# Patient Record
Sex: FEMALE | Race: OTHER | NOT HISPANIC OR LATINO | URBAN - METROPOLITAN AREA
[De-identification: names, ages, dates, MRNs, and addresses within clinical notes are randomized per-mention and may not be internally consistent; named-entity substitution may affect disease eponyms.]

---

## 2017-04-13 ENCOUNTER — APPOINTMENT (OUTPATIENT)
Dept: RADIOLOGY | Facility: MEDICAL CENTER | Age: 22
DRG: 918 | End: 2017-04-13
Attending: EMERGENCY MEDICINE
Payer: COMMERCIAL

## 2017-04-13 ENCOUNTER — HOSPITAL ENCOUNTER (INPATIENT)
Facility: MEDICAL CENTER | Age: 22
LOS: 5 days | DRG: 918 | End: 2017-04-19
Attending: EMERGENCY MEDICINE | Admitting: INTERNAL MEDICINE
Payer: COMMERCIAL

## 2017-04-13 DIAGNOSIS — T50.902A OVERDOSE, INTENTIONAL SELF-HARM, INITIAL ENCOUNTER (HCC): ICD-10-CM

## 2017-04-13 DIAGNOSIS — R00.0 TACHYCARDIA: ICD-10-CM

## 2017-04-13 DIAGNOSIS — E86.0 DEHYDRATION: ICD-10-CM

## 2017-04-13 LAB
ALBUMIN SERPL BCP-MCNC: 3.9 G/DL (ref 3.2–4.9)
ALBUMIN/GLOB SERPL: 1.3 G/DL
ALP SERPL-CCNC: 50 U/L (ref 30–99)
ALT SERPL-CCNC: 13 U/L (ref 2–50)
AMPHET UR QL SCN: NEGATIVE
ANION GAP SERPL CALC-SCNC: 24 MMOL/L (ref 0–11.9)
APAP SERPL-MCNC: 27 UG/ML (ref 10–30)
APPEARANCE UR: CLEAR
AST SERPL-CCNC: 20 U/L (ref 12–45)
BACTERIA #/AREA URNS HPF: ABNORMAL /HPF
BARBITURATES UR QL SCN: NEGATIVE
BASOPHILS # BLD AUTO: 0.6 % (ref 0–1.8)
BASOPHILS # BLD: 0.09 K/UL (ref 0–0.12)
BENZODIAZ UR QL SCN: NEGATIVE
BILIRUB SERPL-MCNC: 0.2 MG/DL (ref 0.1–1.5)
BILIRUB UR QL STRIP.AUTO: NEGATIVE
BUN SERPL-MCNC: 6 MG/DL (ref 8–22)
BZE UR QL SCN: NEGATIVE
CALCIUM SERPL-MCNC: 8.4 MG/DL (ref 8.5–10.5)
CANNABINOIDS UR QL SCN: POSITIVE
CHLORIDE SERPL-SCNC: 107 MMOL/L (ref 96–112)
CO2 SERPL-SCNC: 11 MMOL/L (ref 20–33)
COLOR UR: COLORLESS
CREAT SERPL-MCNC: 0.77 MG/DL (ref 0.5–1.4)
EOSINOPHIL # BLD AUTO: 0.13 K/UL (ref 0–0.51)
EOSINOPHIL NFR BLD: 0.8 % (ref 0–6.9)
EPI CELLS #/AREA URNS HPF: ABNORMAL /HPF
ERYTHROCYTE [DISTWIDTH] IN BLOOD BY AUTOMATED COUNT: 47.5 FL (ref 35.9–50)
ETHANOL BLD-MCNC: 0 G/DL
GFR SERPL CREATININE-BSD FRML MDRD: >60 ML/MIN/1.73 M 2
GLOBULIN SER CALC-MCNC: 3.1 G/DL (ref 1.9–3.5)
GLUCOSE SERPL-MCNC: 122 MG/DL (ref 65–99)
GLUCOSE UR STRIP.AUTO-MCNC: NEGATIVE MG/DL
HCG SERPL QL: NEGATIVE
HCT VFR BLD AUTO: 42.3 % (ref 37–47)
HGB BLD-MCNC: 13.6 G/DL (ref 12–16)
IMM GRANULOCYTES # BLD AUTO: 0.27 K/UL (ref 0–0.11)
IMM GRANULOCYTES NFR BLD AUTO: 1.7 % (ref 0–0.9)
KETONES UR STRIP.AUTO-MCNC: 10 MG/DL
LEUKOCYTE ESTERASE UR QL STRIP.AUTO: NEGATIVE
LYMPHOCYTES # BLD AUTO: 1.74 K/UL (ref 1–4.8)
LYMPHOCYTES NFR BLD: 11.1 % (ref 22–41)
MCH RBC QN AUTO: 30.1 PG (ref 27–33)
MCHC RBC AUTO-ENTMCNC: 32.2 G/DL (ref 33.6–35)
MCV RBC AUTO: 93.6 FL (ref 81.4–97.8)
MDMA UR QL SCN: NEGATIVE
METHADONE UR QL SCN: NEGATIVE
MICRO URNS: ABNORMAL
MONOCYTES # BLD AUTO: 0.62 K/UL (ref 0–0.85)
MONOCYTES NFR BLD AUTO: 4 % (ref 0–13.4)
MUCOUS THREADS #/AREA URNS HPF: ABNORMAL /HPF
NEUTROPHILS # BLD AUTO: 12.78 K/UL (ref 2–7.15)
NEUTROPHILS NFR BLD: 81.8 % (ref 44–72)
NITRITE UR QL STRIP.AUTO: NEGATIVE
NRBC # BLD AUTO: 0 K/UL
NRBC BLD AUTO-RTO: 0 /100 WBC
OPIATES UR QL SCN: NEGATIVE
OXYCODONE UR QL SCN: NEGATIVE
PCP UR QL SCN: NEGATIVE
PH UR STRIP.AUTO: 6 [PH]
PLATELET # BLD AUTO: 268 K/UL (ref 164–446)
PMV BLD AUTO: 10.6 FL (ref 9–12.9)
POTASSIUM SERPL-SCNC: 2.9 MMOL/L (ref 3.6–5.5)
PROPOXYPH UR QL SCN: NEGATIVE
PROT SERPL-MCNC: 7 G/DL (ref 6–8.2)
PROT UR QL STRIP: NEGATIVE MG/DL
RBC # BLD AUTO: 4.52 M/UL (ref 4.2–5.4)
RBC # URNS HPF: ABNORMAL /HPF
RBC UR QL AUTO: ABNORMAL
SALICYLATES SERPL-MCNC: 0 MG/DL (ref 15–25)
SODIUM SERPL-SCNC: 142 MMOL/L (ref 135–145)
SP GR UR STRIP.AUTO: 1.01
WBC # BLD AUTO: 15.6 K/UL (ref 4.8–10.8)
WBC #/AREA URNS HPF: ABNORMAL /HPF

## 2017-04-13 PROCEDURE — 80053 COMPREHEN METABOLIC PANEL: CPT

## 2017-04-13 PROCEDURE — 304561 HCHG STAT O2

## 2017-04-13 PROCEDURE — 36415 COLL VENOUS BLD VENIPUNCTURE: CPT

## 2017-04-13 PROCEDURE — 99285 EMERGENCY DEPT VISIT HI MDM: CPT

## 2017-04-13 PROCEDURE — 70450 CT HEAD/BRAIN W/O DYE: CPT

## 2017-04-13 PROCEDURE — 304562 HCHG STAT O2 MASK/CANNULA

## 2017-04-13 PROCEDURE — 81001 URINALYSIS AUTO W/SCOPE: CPT

## 2017-04-13 PROCEDURE — 80307 DRUG TEST PRSMV CHEM ANLYZR: CPT

## 2017-04-13 PROCEDURE — 96360 HYDRATION IV INFUSION INIT: CPT

## 2017-04-13 PROCEDURE — 51701 INSERT BLADDER CATHETER: CPT

## 2017-04-13 PROCEDURE — 84703 CHORIONIC GONADOTROPIN ASSAY: CPT

## 2017-04-13 PROCEDURE — 85025 COMPLETE CBC W/AUTO DIFF WBC: CPT

## 2017-04-13 PROCEDURE — 700105 HCHG RX REV CODE 258: Performed by: EMERGENCY MEDICINE

## 2017-04-13 RX ORDER — SODIUM CHLORIDE 9 MG/ML
1000 INJECTION, SOLUTION INTRAVENOUS ONCE
Status: COMPLETED | OUTPATIENT
Start: 2017-04-13 | End: 2017-04-13

## 2017-04-13 RX ADMIN — SODIUM CHLORIDE 1000 ML: 9 INJECTION, SOLUTION INTRAVENOUS at 22:36

## 2017-04-13 ASSESSMENT — PAIN SCALES - GENERAL: PAINLEVEL_OUTOF10: ASSUMED PAIN PRESENT

## 2017-04-13 NOTE — IP AVS SNAPSHOT
[employer] Access Code: Activation code not generated  Current [employer] Status: Patient Declined    [employer]  A secure, online tool to manage your health information     [employer]® is a secure, online tool that connects you to your personalized health information from the privacy of your home -- day or night - making it very easy for you to manage your healthcare. Once the activation process is completed, you can even access your medical information using the [employer] april, which is available for free in the Apple April store or Google Play store.     [employer] provides the following levels of access (as shown below):   My Chart Features   [facility] Primary Care Doctor [facility]  Specialists [facility]  Urgent  Care Non-[facility]  Primary Care  Doctor   Email your healthcare team securely and privately 24/7 X X X X   Manage appointments: schedule your next appointment; view details of past/upcoming appointments X      Request prescription refills. X      View recent personal medical records, including lab and immunizations X X X X   View health record, including health history, allergies, medications X X X X   Read reports about your outpatient visits, procedures, consult and ER notes X X X X   See your discharge summary, which is a recap of your hospital and/or ER visit that includes your diagnosis, lab results, and care plan. X X       How to register for [employer]:  1. Go to  [email or web address].  2. Click on the Sign Up Now box, which takes you to the New Member Sign Up page. You will need to provide the following information:  a. Enter your [employer] Access Code exactly as it appears at the top of this page. (You will not need to use this code after you’ve completed the sign-up process. If you do not sign up before the expiration date, you must request a new code.)   b. Enter your date of birth.   c. Enter your home email address.   d. Click Submit, and follow the next screen’s instructions.  3. Create a [employer] ID.  This will be your [employer] login ID and cannot be changed, so think of one that is secure and easy to remember.  4. Create a [employer] password. You can change your password at any time.  5. Enter your Password Reset Question and Answer. This can be used at a later time if you forget your password.   6. Enter your e-mail address. This allows you to receive e-mail notifications when new information is available in [employer].  7. Click Sign Up. You can now view your health information.    For assistance activating your [employer] account, call [phone]

## 2017-04-13 NOTE — IP AVS SNAPSHOT
4/19/2017    Lana Newby  [address] [place] [address]    Dear Lana PAUL:    [facility] wants to ensure your discharge home is safe and you or your loved ones have had all of your questions answered regarding your care after you leave the hospital.    Below is a list of resources and contact information should you have any questions regarding your hospital stay, follow-up instructions, or active medical symptoms.    Questions or Concerns Regarding… Contact   Medical Questions Related to Your Discharge  (7 days a week, 8am-5pm) Contact a Nurse Care Coordinator   [phone]   Medical Questions Not Related to Your Discharge  (24 hours a day / 7 days a week)  Contact the Nurse Health Line   [phone]    Medications or Discharge Instructions Refer to your discharge packet   or contact your [facility] Primary Care Provider   [phone]   Follow-up Appointment(s) Schedule your appointment via [employer]   or contact Scheduling [phone]   Billing Review your statement via [employer]  or contact Billing [phone]   Medical Records Review your records via [employer]   or contact Medical Records [phone]     You may receive a telephone call within two days of discharge. This call is to make certain you understand your discharge instructions and have the opportunity to have any questions answered. You can also easily access your medical information, test results and upcoming appointments via the [employer] free online health management tool. You can learn more and sign up at [employer]. For assistance setting up your [employer] account, please call [phone].    Once again, we want to ensure your discharge home is safe and that you have a clear understanding of any next steps in your care. If you have any questions or concerns, please do not hesitate to contact us, we are here for you. Thank you for choosing [facility] for your healthcare needs.    Sincerely,    Your [facility] Healthcare Team

## 2017-04-13 NOTE — IP AVS SNAPSHOT
" <p align=\"LEFT\"><IMG SRC=\"//[email or web address]$/[email or web address]\" alt=\"Image\" WIDTH=\"50%\" HEIGHT=\"200\" BORDER=\"\"></p>                   Name:Lana Newby  Medical Record Number:[id number]  CSN: [id number]    YOB: 1995   Age: 22 y.o.  Sex: female  HT:1.6 m (5' 3\") WT: 53.1 kg (117 lb 1 oz)          Admit Date: 4/13/2017     Discharge Date:   Today's Date: 4/19/2017  Attending Doctor:  ROMINA Ludwig*                  Allergies:  Amoxicillin; Bactrim ds; and Other drug          Your appointments     Apr 20, 2017  1:00 PM   CARE MANAGER TELEPHONE VISIT with CARE MANAGER   [address] [address] [phone]           ***IMPORTANT**** This is a phone visit only. Do not come into the office. The Care Manager will call you at the scheduled time for Care Manager Telephone Visit.            Apr 26, 2017  3:40 PM   Established Patient with BUNNY Sommer   [address] [address] [phone]           You will be receiving a confirmation call a few days before your appointment from our automated call confirmation system.            May 31, 2017  3:00 PM   Established Patient with Jerson Aguilar M.D.   [address] [id number] [phone]           You will be receiving a confirmation call a few days before your appointment from our automated call confirmation system.                 Medication List      Notice     You have not been prescribed any medications.      "

## 2017-04-13 NOTE — IP AVS SNAPSHOT
" Home Care Instructions                                                                                                                  Name:Lana Newby  Medical Record Number:[id number]  CSN: [id number]    YOB: 1995   Age: 22 y.o.  Sex: female  HT:1.6 m (5' 3\") WT: 53.1 kg (117 lb 1 oz)          Admit Date: 4/13/2017     Discharge Date:   Today's Date: 4/19/2017  Attending Doctor:  ROMINA Ludwig*                  Allergies:  Amoxicillin; Bactrim ds; and Other drug            Discharge Instructions       Discharge Instructions    Discharged to home by car with relative. Discharged via walking, hospital escort: Refused.  Special equipment needed: Not Applicable    Be sure to schedule a follow-up appointment with your primary care doctor or any specialists as instructed.     Discharge Plan:   Diet Plan: Discussed  Activity Level: Discussed  Confirmed Follow up Appointment: Patient to Call and Schedule Appointment  Confirmed Symptoms Management: Discussed  Medication Reconciliation Updated: Yes  Influenza Vaccine Indication: Patient Refuses    I understand that a diet low in cholesterol, fat, and sodium is recommended for good health. Unless I have been given specific instructions below for another diet, I accept this instruction as my diet prescription.   Other diet: Regular    Special Instructions: None    · Is patient discharged on Warfarin / Coumadin?   No     · Is patient Post Blood Transfusion?  No    Depression / Suicide Risk    As you are discharged from this [facility] facility, it is important to learn how to keep safe from harming yourself.    Recognize the warning signs:  · Abrupt changes in personality, positive or negative- including increase in energy   · Giving away possessions  · Change in eating patterns- significant weight changes-  positive or negative  · Change in sleeping patterns- unable to sleep or sleeping all the time   · Unwillingness or inability to " "communicate  · Depression  · Unusual sadness, discouragement and loneliness  · Talk of wanting to die  · Neglect of personal appearance   · Rebelliousness- reckless behavior  · Withdrawal from people/activities they love  · Confusion- inability to concentrate     If you or a loved one observes any of these behaviors or has concerns about self-harm, here's what you can do:  · Talk about it- your feelings and reasons for harming yourself  · Remove any means that you might use to hurt yourself (examples: pills, rope, extension cords, firearm)  · Get professional help from the community (Mental Health, Substance Abuse, psychological counseling)  · Do not be alone:Call your Safe Contact- someone whom you trust who will be there for you.  · Call your local CRISIS HOTLINE 695-3695 or 257-886-5142  · Call your local Children's Mobile Crisis Response Team [place] (246) 324-5302 or www.[employer]  · Call the toll free National Suicide Prevention Hotlines   · National Suicide Prevention Lifeline 214-737-LEFY (4862)  · [employer] Hope Line Network 800-SUICIDE (900-1196)      Overdose, Adult  A person can overdose on alcohol, drugs or both by accident or on purpose. If it was on purpose, it is a serious matter. Professional help should be sought. If the overdose was an accident, certain steps should be taken to make sure that it never happens again.  ACCIDENTAL OVERDOSE  Overdosing on prescription medications can be a result of:  · Not understanding the instructions.  · Misreading the label.  · Forgetting that you took a dose and then taking another by mistake. This situation happens a lot.  To make sure this does not happen again:  · Clarify the correct dosage with your caregiver.  · Place the correct dosage in a \"pill-minder\" container (labeled for each day and time of day).  · Have someone dispense your medicine.  Please be sure to follow-up with your primary care doctor as directed.  INTENTIONAL OVERDOSE  If the " "overdose was on purpose, it is a serious situation. Taking more than the prescribed amount of medications (including taking someone else's prescription), abusing street drugs or drinking an amount of alcohol that requires medical treatment can show a variety of possible problems. These may indicate you:  · Are depressed or suicidal.  · Are abusing drugs, took too much or combined different drugs to experiment with the effects.  · Mixed alcohol with drugs and did not realize the danger of doing so (this is drug abuse).  · Are suffering addiction to drugs and/or alcohol (also known as chemical dependency).  · Binge drink.  If you have not been referred to a mental health professional for help, it is important that you get help right away. Only a professional can determine which problems may exist and what the best course of treatment may be. It is your responsibility to follow-up with further evaluation or treatment as directed.   Alcohol is responsible for a large number of overdoses and unintended deaths among college-age young adults. Binge drinking is consuming 4-5 drinks in a short period of time. The amount of alcohol in standard servings of wine (5 oz.), beer (12 oz.) and distilled spirits (1.5 oz., 80 proof) is the same. Beer or wine can be just as dangerous to the binge drinker as \"hard\" liquor can be.   CONSEQUENCES OF BINGE DRINKING  Alcohol poisoning is the most serious consequence of binge drinking. This is a severe and potentially fatal physical reaction to an alcohol overdose. When too much alcohol is consumed, the brain does not get enough oxygen. The lack of oxygen will eventually cause the brain to shut down the voluntary functions that regulate breathing and heart rate. Symptoms of alcohol poisoning include:  · Vomiting.  · Passing out (unconsciousness).  · Cold, clammy, pale or bluish skin.  · Slow or irregular breathing.  WHAT SHOULD I DO NEXT?  If you have a history of drug abuse or suffer " "chemical dependency (alcoholism, drug addiction or both), you might consider the following:  · Talk with a qualified substance abuse counselor and consider entering a treatment program.  · Go to a detox facility if necessary.  · If you were attending self-help group meetings, consider returning to them and go often.  · Explore other resources located near you (see sources listed below).  If you are unsure if you have a substance abuse problem, ask yourself the following questions:  · Have you been told by friends or family that drugs/alcohol has become a problem?  · Do you get into fights when drinking or using drugs?  · Do you have blackouts (not remembering what you do while using)?  · Do you lie about use or amounts of drugs or alcohol you consume?  · Do you need chemicals to get you going?  · Do you suffer in work or school performance because of drug or alcohol use?  · Do you get sick from drug or alcohol use but continue to use anyway?  · Do you need drugs or alcohol to relate to people or feel comfortable in social situations?  · Do you use drugs or alcohol to forget problems?  If you answered \"Yes\" to any of the above questions, it means you show signs of chemical dependency and a professional evaluation is suggested. The longer the use of drugs and alcohol continues, the problems will become greater.  SEEK IMMEDIATE MEDICAL CARE IF:   · You feel like you might repeat your problematic behavior.  · You need someone to talk to and feel that it should not wait.  · You feel you are a danger to yourself or someone else.  · You feel like you are having a new reaction to medications you are taking, or you are getting worse after leaving a care center.  · You have an overwhelming urge to drink or use drugs.  Addiction cannot be cured, but it can be treated successfully. Treatment centers are listed in the yellow pages under: Cocaine, Narcotics, and Alcoholics Anonymous. Most hospitals and clinics can refer you to a " specialized care center. The [place] government maintains a toll-free number for obtaining treatment referrals: 1-940.655.1009 or 1-947.958.9621 (TDD) and maintains a website: http://findtreatment.[facility].gov. Other websites for additional information are: www.mentalhealth.[facility].gov. and www.[place].gov.  [place] treatment resources are listed in each Province with listings available under [facility] or similar titles.  Document Released: 12/20/2004 Document Revised: 03/11/2013 Document Reviewed: 11/11/2009  ExitCare® Patient Information ©2014 [employer].      Your appointments     Apr 20, 2017  1:00 PM   CARE MANAGER TELEPHONE VISIT with CARE MANAGER   [facility]    [address]  [facility] [id number] [phone]           ***IMPORTANT**** This is a phone visit only. Do not come into the office. The Care Manager will call you at the scheduled time for Care Manager Telephone Visit.            Apr 26, 2017  3:40 PM   Established Patient with BUNNY Sommer   [facility])    [address]  [place] [id number] [phone]           You will be receiving a confirmation call a few days before your appointment from our automated call confirmation system.            May 31, 2017  3:00 PM   Established Patient with Jerson Aguilar M.D.   [facility])    [address] [place] [id number] [phone]           You will be receiving a confirmation call a few days before your appointment from our automated call confirmation system.                 Discharge Medication Instructions:    Below are the medications your physician expects you to take upon discharge:    Review all your home medications and newly ordered medications with your doctor and/or pharmacist. Follow medication instructions as directed by your doctor and/or pharmacist.    Please keep your medication list with you and share with  your physician.               Medication List      Notice     You have not been prescribed any medications.            Instructions           Diet / Nutrition:    Follow any diet instructions given to you by your doctor or the dietician, including how much salt (sodium) you are allowed each day.    If you are overweight, talk to your doctor about a weight reduction plan.    Activity:    Remain physically active following your doctor's instructions about exercise and activity.    Rest often.     Any time you become even a little tired or short of breath, SIT DOWN and rest.    Worsening Symptoms:    Report any of the following signs and symptoms to the doctor's office immediately:    *Pain of jaw, arm, or neck  *Chest pain not relieved by medication                               *Dizziness or loss of consciousness  *Difficulty breathing even when at rest   *More tired than usual                                       *Bleeding drainage or swelling of surgical site  *Swelling of feet, ankles, legs or stomach                 *Fever (>100ºF)  *Pink or blood tinged sputum  *Weight gain (3lbs/day or 5lbs /week)           *Shock from internal defibrillator (if applicable)  *Palpitations or irregular heartbeats                *Cool and/or numb extremities    Stroke Awareness    Common Risk Factors for Stroke include:    Age  Atrial Fibrillation  Carotid Artery Stenosis  Diabetes Mellitus  Excessive alcohol consumption  High blood pressure  Overweight   Physical inactivity  Smoking    Warning signs and symptoms of a stroke include:    *Sudden numbness or weakness of the face, arm or leg (especially on one side of the body).  *Sudden confusion, trouble speaking or understanding.  *Sudden trouble seeing in one or both eyes.  *Sudden trouble walking, dizziness, loss of balance or coordination.Sudden severe headache with no known cause.    It is very important to get treatment quickly when a stroke occurs. If you experience any of  the above warning signs, call 911 immediately.                   Disclaimer         Quit Smoking / Tobacco Use:    I understand the use of any tobacco products increases my chance of suffering from future heart disease or stroke and could cause other illnesses which may shorten my life. Quitting the use of tobacco products is the single most important thing I can do to improve my health. For further information on smoking / tobacco cessation call a Toll Free Quit Line at 1-383.513.9847 (*National Cancer [place]) or 1-899.163.2828 (American Lung Association) or you can access the web based program at www.lungusa.org.    [place] Tobacco Users Help Line:  (538) 288-1919       Toll Free: 1-439.587.3109  Quit Tobacco Program [facility] (007)777-3521    Crisis Hotline:    [place] Crisis Hotline:  8-379-GQBAGMX or 1-528.982.3895    [place] Crisis Hotline:    1-346.453.7098 or 882-244-0367    Discharge Survey:   Thank you for choosing [facility]. We hope we did everything we could to make your hospital stay a pleasant one. You may be receiving a phone survey and we would appreciate your time and participation in answering the questions. Your input is very valuable to us in our efforts to improve our service to our patients and their families.        My signature on this form indicates that:    1. I have reviewed and understand the above information.  2. My questions regarding this information have been answered to my satisfaction.  3. I have formulated a plan with my discharge nurse to obtain my prescribed medications for home.                  Disclaimer         __________________________________                     __________       ________                       Patient Signature                                                 Date                    Time

## 2017-04-14 ENCOUNTER — RESOLUTE PROFESSIONAL BILLING HOSPITAL PROF FEE (OUTPATIENT)
Dept: HOSPITALIST | Facility: MEDICAL CENTER | Age: 22
End: 2017-04-14
Payer: COMMERCIAL

## 2017-04-14 PROBLEM — T50.901A DRUG OVERDOSE: Status: ACTIVE | Noted: 2017-04-14

## 2017-04-14 PROBLEM — R45.851 SUICIDAL IDEATION: Status: ACTIVE | Noted: 2017-04-14

## 2017-04-14 LAB
ALBUMIN SERPL BCP-MCNC: 4.1 G/DL (ref 3.2–4.9)
ANION GAP SERPL CALC-SCNC: 13 MMOL/L (ref 0–11.9)
APAP SERPL-MCNC: <10 UG/ML (ref 10–30)
BUN SERPL-MCNC: 5 MG/DL (ref 8–22)
CALCIUM SERPL-MCNC: 8.2 MG/DL (ref 8.5–10.5)
CHLORIDE SERPL-SCNC: 107 MMOL/L (ref 96–112)
CO2 SERPL-SCNC: 20 MMOL/L (ref 20–33)
CREAT SERPL-MCNC: 0.59 MG/DL (ref 0.5–1.4)
ERYTHROCYTE [DISTWIDTH] IN BLOOD BY AUTOMATED COUNT: 45.5 FL (ref 35.9–50)
GFR SERPL CREATININE-BSD FRML MDRD: >60 ML/MIN/1.73 M 2
GLUCOSE SERPL-MCNC: 86 MG/DL (ref 65–99)
HCT VFR BLD AUTO: 41.4 % (ref 37–47)
HGB BLD-MCNC: 13.8 G/DL (ref 12–16)
MAGNESIUM SERPL-MCNC: 2 MG/DL (ref 1.5–2.5)
MCH RBC QN AUTO: 29.9 PG (ref 27–33)
MCHC RBC AUTO-ENTMCNC: 33.3 G/DL (ref 33.6–35)
MCV RBC AUTO: 89.8 FL (ref 81.4–97.8)
PHOSPHATE SERPL-MCNC: 2.5 MG/DL (ref 2.5–4.5)
PLATELET # BLD AUTO: 250 K/UL (ref 164–446)
PMV BLD AUTO: 10.2 FL (ref 9–12.9)
POTASSIUM SERPL-SCNC: 3 MMOL/L (ref 3.6–5.5)
RBC # BLD AUTO: 4.61 M/UL (ref 4.2–5.4)
SODIUM SERPL-SCNC: 140 MMOL/L (ref 135–145)
TSH SERPL DL<=0.005 MIU/L-ACNC: 0.63 UIU/ML (ref 0.3–3.7)
WBC # BLD AUTO: 17.7 K/UL (ref 4.8–10.8)

## 2017-04-14 PROCEDURE — 85027 COMPLETE CBC AUTOMATED: CPT

## 2017-04-14 PROCEDURE — 93005 ELECTROCARDIOGRAM TRACING: CPT | Performed by: EMERGENCY MEDICINE

## 2017-04-14 PROCEDURE — 80307 DRUG TEST PRSMV CHEM ANLYZR: CPT

## 2017-04-14 PROCEDURE — 83735 ASSAY OF MAGNESIUM: CPT

## 2017-04-14 PROCEDURE — 770020 HCHG ROOM/CARE - TELE (206)

## 2017-04-14 PROCEDURE — A9270 NON-COVERED ITEM OR SERVICE: HCPCS | Performed by: INTERNAL MEDICINE

## 2017-04-14 PROCEDURE — 36415 COLL VENOUS BLD VENIPUNCTURE: CPT

## 2017-04-14 PROCEDURE — 700102 HCHG RX REV CODE 250 W/ 637 OVERRIDE(OP): Performed by: INTERNAL MEDICINE

## 2017-04-14 PROCEDURE — 80069 RENAL FUNCTION PANEL: CPT

## 2017-04-14 PROCEDURE — 99223 1ST HOSP IP/OBS HIGH 75: CPT | Performed by: INTERNAL MEDICINE

## 2017-04-14 PROCEDURE — 84443 ASSAY THYROID STIM HORMONE: CPT

## 2017-04-14 PROCEDURE — 700105 HCHG RX REV CODE 258: Performed by: INTERNAL MEDICINE

## 2017-04-14 RX ORDER — POLYETHYLENE GLYCOL 3350 17 G/17G
1 POWDER, FOR SOLUTION ORAL
Status: DISCONTINUED | OUTPATIENT
Start: 2017-04-14 | End: 2017-04-19 | Stop reason: HOSPADM

## 2017-04-14 RX ORDER — POTASSIUM CHLORIDE 1.5 G/1.58G
40 POWDER, FOR SOLUTION ORAL
Status: COMPLETED | OUTPATIENT
Start: 2017-04-14 | End: 2017-04-15

## 2017-04-14 RX ORDER — BISACODYL 10 MG
10 SUPPOSITORY, RECTAL RECTAL
Status: DISCONTINUED | OUTPATIENT
Start: 2017-04-14 | End: 2017-04-19 | Stop reason: HOSPADM

## 2017-04-14 RX ORDER — AMOXICILLIN 250 MG
2 CAPSULE ORAL 2 TIMES DAILY
Status: DISCONTINUED | OUTPATIENT
Start: 2017-04-14 | End: 2017-04-19 | Stop reason: HOSPADM

## 2017-04-14 RX ORDER — FAMOTIDINE 20 MG/1
20 TABLET, FILM COATED ORAL 2 TIMES DAILY
Status: DISCONTINUED | OUTPATIENT
Start: 2017-04-14 | End: 2017-04-18

## 2017-04-14 RX ORDER — LORAZEPAM 2 MG/ML
1 INJECTION INTRAMUSCULAR EVERY 4 HOURS PRN
Status: DISCONTINUED | OUTPATIENT
Start: 2017-04-14 | End: 2017-04-19 | Stop reason: HOSPADM

## 2017-04-14 RX ORDER — SODIUM CHLORIDE 9 MG/ML
INJECTION, SOLUTION INTRAVENOUS CONTINUOUS
Status: DISCONTINUED | OUTPATIENT
Start: 2017-04-14 | End: 2017-04-18

## 2017-04-14 RX ORDER — CLONIDINE HYDROCHLORIDE 0.1 MG/1
0.1 TABLET ORAL EVERY 8 HOURS PRN
Status: DISCONTINUED | OUTPATIENT
Start: 2017-04-14 | End: 2017-04-19 | Stop reason: HOSPADM

## 2017-04-14 RX ORDER — POTASSIUM CHLORIDE 20 MEQ/1
40 TABLET, EXTENDED RELEASE ORAL
Status: COMPLETED | OUTPATIENT
Start: 2017-04-14 | End: 2017-04-14

## 2017-04-14 RX ADMIN — STANDARDIZED SENNA CONCENTRATE AND DOCUSATE SODIUM 2 TABLET: 8.6; 5 TABLET, FILM COATED ORAL at 21:52

## 2017-04-14 RX ADMIN — POTASSIUM CHLORIDE 40 MEQ: 1500 TABLET, EXTENDED RELEASE ORAL at 17:35

## 2017-04-14 RX ADMIN — SODIUM CHLORIDE: 9 INJECTION, SOLUTION INTRAVENOUS at 04:13

## 2017-04-14 RX ADMIN — POTASSIUM CHLORIDE 40 MEQ: 1.5 POWDER, FOR SOLUTION ORAL at 19:21

## 2017-04-14 RX ADMIN — SODIUM CHLORIDE: 9 INJECTION, SOLUTION INTRAVENOUS at 21:59

## 2017-04-14 RX ADMIN — POTASSIUM CHLORIDE 40 MEQ: 1.5 POWDER, FOR SOLUTION ORAL at 21:52

## 2017-04-14 RX ADMIN — SODIUM CHLORIDE: 9 INJECTION, SOLUTION INTRAVENOUS at 12:30

## 2017-04-14 RX ADMIN — FAMOTIDINE 20 MG: 20 TABLET, FILM COATED ORAL at 21:52

## 2017-04-14 ASSESSMENT — COPD QUESTIONNAIRES
DO YOU EVER COUGH UP ANY MUCUS OR PHLEGM?: NO/ONLY WITH OCCASIONAL COLDS OR INFECTIONS
DURING THE PAST 4 WEEKS HOW MUCH DID YOU FEEL SHORT OF BREATH: NONE/LITTLE OF THE TIME
HAVE YOU SMOKED AT LEAST 100 CIGARETTES IN YOUR ENTIRE LIFE: NO/DON'T KNOW
COPD SCREENING SCORE: 0

## 2017-04-14 ASSESSMENT — PAIN SCALES - GENERAL
PAINLEVEL_OUTOF10: 0

## 2017-04-14 ASSESSMENT — LIFESTYLE VARIABLES
EVER_SMOKED: NEVER
EVER_SMOKED: NEVER
ALCOHOL_USE: NO

## 2017-04-14 NOTE — ED PROVIDER NOTES
"ED Provider Note    Scribed for Liliya Alba M.D. by Rika Pollock. 4/13/2017, 10:24 PM.    Primary care provider: Pcp Pt States None  Means of arrival: Ambulance   History obtained from: Patient   History limited by: Patient's refusal to answer questions.      CHIEF COMPLAINT  Chief Complaint   Patient presents with   • Drug Overdose   • Depression   • Suicidal Ideation       HPI  Lana Newby is a 22 y.o. female who presents to the Emergency Department due to drug overdose. Patient reports taking an unknown amount of \"tiny blue\" sleeping pills. Her mother made her drink vinegar and attempted to stick her fingers down her throat afterward. She reports depression and suicidal ideation. She has a history of previous suicide attempt. Per EMS, patient had a 30 second long seizure en route to the ED. She denies fever or chills.     History of present illness limited by patient's refusal to answer questions.     REVIEW OF SYSTEMS  Pertinent positives include drug overdose, depression, suicidal ideation. Pertinent negatives include no fever, chills.     Review of systems limited by patient's refusal to answer questions.     PAST MEDICAL HISTORY   has a past medical history of Scoliosis.    SURGICAL HISTORY   has past surgical history that includes other and skeletal.    SOCIAL HISTORY  Social History   Substance Use Topics   • Smoking status: Never Smoker    • Smokeless tobacco: Never Used   • Alcohol Use: No      History   Drug Use No       FAMILY HISTORY  None noted.     CURRENT MEDICATIONS  No current facility-administered medications on file prior to encounter.     No current outpatient prescriptions on file prior to encounter.       ALLERGIES  Allergies   Allergen Reactions   • Bactrim Ds Rash   • Other Drug Unspecified     Unknown \"sinus medicine\"; pt to ask mother; causes headache       PHYSICAL EXAM  VITAL SIGNS: /76 mmHg  Pulse 132  Temp(Src) 36.3 °C (97.4 °F)  Resp 24  Ht 1.6 m (5' 3\")  Wt 50.803 " kg (112 lb)  BMI 19.84 kg/m2  SpO2 89%  Constitutional: Alert in no apparent distress. Anxious appearing  HENT:  Bilateral external ears normal, Nose normal. Some dry blood on mouth.   Eyes: Pupils are equal and reactive, Conjunctiva normal, Non-icteric.   Neck: Normal range of motion, No tenderness, Supple, No stridor.   Cardiovascular: no murmurs. Tachycardic.   Thorax & Lungs: Normal breath sounds, No respiratory distress, No wheezing, No chest tenderness.   Abdomen: Bowel sounds normal, Soft, No tenderness, No masses, No peritoneal signs.  Skin: Warm, Dry, No erythema, No rash.   Musculoskeletal:  No major deformities noted. No lower extremity edema.   Neurologic: Alert, moving all extremities without difficulty, no focal deficits.  Psychiatric: Odd affect, intermittently responsive and answering questions then would not respond but was looking around the room and seemed hyper stimulated at times.     LABS  Results for orders placed or performed during the hospital encounter of 04/13/17   URINE DRUG SCREEN (TRIAGE)   Result Value Ref Range    Amphetamines Urine Negative Negative    Barbiturates Negative Negative    Benzodiazepines Negative Negative    Cocaine Metabolite Negative Negative    Methadone Negative Negative    Ecstasy Negative Negative    Opiates Negative Negative    Oxycodone Negative Negative    Phencyclidine -Pcp Negative Negative    Propoxyphene Negative Negative    Cannabinoid Metab Positive (A) Negative   Blood Alcohol   Result Value Ref Range    Diagnostic Alcohol 0.00 0.00 g/dL   CBC WITH DIFFERENTIAL   Result Value Ref Range    WBC 15.6 (H) 4.8 - 10.8 K/uL    RBC 4.52 4.20 - 5.40 M/uL    Hemoglobin 13.6 12.0 - 16.0 g/dL    Hematocrit 42.3 37.0 - 47.0 %    MCV 93.6 81.4 - 97.8 fL    MCH 30.1 27.0 - 33.0 pg    MCHC 32.2 (L) 33.6 - 35.0 g/dL    RDW 47.5 35.9 - 50.0 fL    Platelet Count 268 164 - 446 K/uL    MPV 10.6 9.0 - 12.9 fL    Neutrophils-Polys 81.80 (H) 44.00 - 72.00 %    Lymphocytes  11.10 (L) 22.00 - 41.00 %    Monocytes 4.00 0.00 - 13.40 %    Eosinophils 0.80 0.00 - 6.90 %    Basophils 0.60 0.00 - 1.80 %    Immature Granulocytes 1.70 (H) 0.00 - 0.90 %    Nucleated RBC 0.00 /100 WBC    Neutrophils (Absolute) 12.78 (H) 2.00 - 7.15 K/uL    Lymphs (Absolute) 1.74 1.00 - 4.80 K/uL    Monos (Absolute) 0.62 0.00 - 0.85 K/uL    Eos (Absolute) 0.13 0.00 - 0.51 K/uL    Baso (Absolute) 0.09 0.00 - 0.12 K/uL    Immature Granulocytes (abs) 0.27 (H) 0.00 - 0.11 K/uL    NRBC (Absolute) 0.00 K/uL   COMP METABOLIC PANEL   Result Value Ref Range    Sodium 142 135 - 145 mmol/L    Potassium 2.9 (L) 3.6 - 5.5 mmol/L    Chloride 107 96 - 112 mmol/L    Co2 11 (L) 20 - 33 mmol/L    Anion Gap 24.0 (H) 0.0 - 11.9    Glucose 122 (H) 65 - 99 mg/dL    Bun 6 (L) 8 - 22 mg/dL    Creatinine 0.77 0.50 - 1.40 mg/dL    Calcium 8.4 (L) 8.5 - 10.5 mg/dL    AST(SGOT) 20 12 - 45 U/L    ALT(SGPT) 13 2 - 50 U/L    Alkaline Phosphatase 50 30 - 99 U/L    Total Bilirubin 0.2 0.1 - 1.5 mg/dL    Albumin 3.9 3.2 - 4.9 g/dL    Total Protein 7.0 6.0 - 8.2 g/dL    Globulin 3.1 1.9 - 3.5 g/dL    A-G Ratio 1.3 g/dL   HCG QUAL SERUM   Result Value Ref Range    Beta-Hcg Qualitative Serum Negative Negative   Acetaminophen Level   Result Value Ref Range    Acetomenophen -Tylenol 27 10 - 30 ug/mL   SALICYLATE LEVEL   Result Value Ref Range    Salicylates, Quant. 0 (L) 15 - 25 mg/dL   Urinalysis   Result Value Ref Range    Micro Urine Req Microscopic     Color Colorless     Character Clear     Specific Gravity 1.008 <1.035    Ph 6.0 5.0-8.0    Glucose Negative Negative mg/dL    Ketones 10 (A) Negative mg/dL    Protein Negative Negative mg/dL    Bilirubin Negative Negative    Nitrite Negative Negative    Leukocyte Esterase Negative Negative    Occult Blood Moderate (A) Negative   URINE MICROSCOPIC (W/UA)   Result Value Ref Range    WBC 0-2 /hpf    RBC 0-2 /hpf    Bacteria Few (A) None /hpf    Epithelial Cells Few /hpf    Mucous Threads Few /hpf    ESTIMATED GFR   Result Value Ref Range    GFR If African American >60 >60 mL/min/1.73 m 2    GFR If Non African American >60 >60 mL/min/1.73 m 2   EKG (ER)   Result Value Ref Range    Report       [facility] Emergency Dept.    Test Date:  2017  Pt Name:    LYNN JOHNS             Department: ER  MRN:        [id number]                      Room:       [facility]  Gender:     F                            Technician: [id number]  :        1995                   Requested By:LILLIE VASQUEZ  Order #:    [id number]                    Reading MD:    Measurements  Intervals                                Axis  Rate:       109                          P:          0  DC:         182                          QRS:        86  QRSD:       88                           T:          27  QT:         297  QTc:        400    Interpretive Statements  SINUS TACHYCARDIA  No previous ECG available for comparison     All labs reviewed by me.    EKG  EKG  12 Lead EKG interpreted by me to show:  sinus tachycardia  Rate 109  Axis: Normal  Intervals: Normal  Normal QTC  My impression of this EKG: Does not indicate ischemia or arrythmia at this time.      RADIOLOGY  CT-HEAD W/O   Final Result      1.  There is no acute intracranial process.      The radiologist's interpretation of all radiological studies have been reviewed by me.    COURSE & MEDICAL DECISION MAKING  Pertinent Labs & Imaging studies reviewed. (See chart for details)     10:24 PM - Patient seen and examined at bedside. Patient will be treated with IV fluids for her heart rate. Ordered CT head, urine drug screen, blood alcohol, CBC with differential, CMP, HCG qual serum, acetaminophen level, salicylate level, urinalysis, urine microscopic, and estimated GFR to evaluate her symptoms.     11:54 PM Patient rechecked; she is resting in bed. Patient's heart rate is improved into the low 100s at this time after IV fluid administration. Patient's mother is at  bedside. She believes the patient took Advil or Aleve PM. I informed her that the patient would need to be admitted for evaluation. Patient and her mother understand and agree.     11:58 PM Spoke with Dr. Dozier, hospitalist, concerning patient case. Agreed to admit patient for further treatment and evaluation. Transfer of care initiated at this time.       Decision Making:  This is a 22 y.o. year old female who presents with intentional overdose. Apparently she broke up with her boyfriend and has been fighting with him. Mom was not there when this occurred. Mom believes that she took Advil or Aleve PM. Patient's Tylenol level is 27, diagnostic alcohol is 0 she doesn't slightly elevated white blood cell count with a left shift. Urine tox is positive for marijuana. She also has evidence of dehydration with a low bicarb elevated anion gap. She is hypokalemic with potassium of 2.9. She will be admitted to the hospitalist service for further medical stabilization and psychiatric consultation.    DISPOSITION:  Patient will be admitted to Dr. Dozier in guarded condition.      FINAL IMPRESSION  1. Overdose, intentional self-harm, initial encounter ([facility])    2. Tachycardia    3. Dehydration           This dictation has been created using voice recognition software and/or scribes. The accuracy of the dictation is limited by the abilities of the software and the expertise of the scribes. I expect there may be some errors of grammar and possibly content. I made every attempt to manually correct the errors within my dictation. However, errors related to voice recognition software and/or scribes may still exist and should be interpreted within the appropriate context.     IRika (Jenniferibshy), am scribing for, and in the presence of, Liliya Alba M.D..    Electronically signed by: Rika Pollock (Scribe), 4/13/2017    Liliya GOODMAN M.D. personally performed the services described in this documentation, as scribed by  Rika Pollock in my presence, and it is both accurate and complete.    The note accurately reflects work and decisions made by me.  Liliya Alba  4/14/2017  3:27 AM

## 2017-04-14 NOTE — PROGRESS NOTES
2 RN skin check.  Lips are cracked with a small cut, likely from teeth, tip of tongue is edematous.  Small red spots on back.  All bony prominences free of skin breakdown.

## 2017-04-14 NOTE — ED NOTES
Pt BIB EMS for overdose/SI.  Pt took an unknown amount of sleeping pills.  Pt states she was depressed and that she did intend to harm herself.  Pt's mother made her drink vinegar and attempted to stick her fingers down her throat.  Per EMS pt did have a 30 sec seizure.      Upon arrival Lana is answering questions intermittently.  Sats are low, placed on 2L NC.  Pupils equal and reactive.  Pt did have one round of emesis which smells of vinegar.

## 2017-04-14 NOTE — PROGRESS NOTES
Pt transported to T825 via pt transport and security.  Mother, Leeanne at bedside.  Asked to leave, left without incidence. 1:1 sitter at bedside. Monitor on, monitor room notified, pt currently SR HR 80s. Pt from gurney to bathroom, 2 person assist, pt with unsteady and staggering gate, pt vomited brown emesis.  Pt hallucinating, attempting to talk to her mom on the phone which is actually the pulse ox finger probe.

## 2017-04-14 NOTE — ED NOTES
Pt up to bedside commode with assist of one, unsteady gate.  Pt continues to hallucinate.  Mother remains at bedside.

## 2017-04-14 NOTE — PROGRESS NOTES
Med rec complete per pt at bedside.  Pt denies taking medications.   Allergies reviewed and updated.  No oral ABX within last 30 days.

## 2017-04-14 NOTE — DISCHARGE PLANNING
Medical Social Work    SW requested assigned MD complete pg 2 of pt's legal hold documents so SW can forward hold extension to legal department.     Plan: Assigned MD will complete page 2 of legal hold. SW will ensure extension paperwork is obtained by legal.

## 2017-04-14 NOTE — ED NOTES
Pt evaluated by admitting MD.  Mother and pt updated on POC.  Pt slightly more awake; however, she is still hallucinating.  Mother aware pt is on a legal hold.

## 2017-04-14 NOTE — DIETARY
Legal Hold    Date of Legal Hold: 17  Time of Legal Hold:     Where was patient when legal hold initiated: ER    Legal Hold will : 17    Medical Clearance Complete: no    Psychiatric Certification Complete: no    Paperwork sent to  for extension: yes    What doctor will be signing the extension: Pending Psych Eval.     Extension paperwork attained: In process    Will patient present to [facility] on Wednesday morning?: yes    Referral placed to Psychiatric Facility: no    Ongoing Plan: Pt to be seen by psych and evaluated. Once pt is medically cleared a referral will be made to psych facilities should pt continue to remain on hold.

## 2017-04-14 NOTE — H&P
"HOSPITAL MEDICINE ADMISSION NOTE    Date of Service: 2017   Name: Lana Newby   : 1995  MRN: [id number]  Primary Care Physician: Pcp Pt States None    Chief Complaint  Drug overdose, suicidal ideation    History of Present Illness  Lana Newby is a 22 y.o. female who was brought in for drug overdose and suicidal ideation. She would not disclose anything to me and her mother was at bedside who wouldn't say much as well. Report was obtained from the nursing staff. Basically, she broke up with her boyfriend and this evening took an unknown amount of pills thought to be etodolac by her mother, in an attempt to sleep. Mother had reported she witnessed a \"seizure\" episode at home. Mother also mentioned she has had suicidal ideation before.  At the ED, she is tachycardic and slightly hypertensive. Respirations were stable. EKG showed sinus tachycardia and QTc within normal limits. Hypokalemic and leukocytosis. U/A did not show UTI. Drug screen positive for cannbinoids; Tylenol and salicylate lvls negative. ED physician felt she was not stable medically and requested observation. Legal hold was being placed.  When I saw her at the ED, she was in no acute distress. Auscultation clear. Tachycardia. No cuts or marks on her arms. Mother was at bedside.    Home Medications  No current facility-administered medications on file prior to encounter.     No current outpatient prescriptions on file prior to encounter.       Allergies  Bactrim ds and Other drug    Past Medical and Surgical History  Past Medical History   Diagnosis Date   • Scoliosis      Past Surgical History   Procedure Laterality Date   • Other       scoliosis sx    • Skeletal       Scoliosis Repair       Family History  No family history on file.    Social History  Social History     Social History   • Marital Status: Single     Spouse Name: N/A   • Number of Children: N/A   • Years of Education: N/A     Occupational History   • Not on file. "     Social History Main Topics   • Smoking status: Never Smoker    • Smokeless tobacco: Never Used   • Alcohol Use: No   • Drug Use: No   • Sexual Activity: No     Other Topics Concern   • Not on file     Social History Narrative    ** Merged History Encounter **            Review of Systems  Unable to obtain reliably as she was very guarded with her story.  Physical Exam  Filed Vitals:    04/14/17 0143 04/14/17 0145 04/14/17 0215 04/14/17 0230   BP:   123/81    Pulse:   100    Temp:   37.2 °C (99 °F)    Resp: 23  16 18   Height:       Weight:       SpO2: 85% 100% 95% 100%   No intake or output data in the 24 hours ending 04/14/17 0307  Vitals Reviewed  General/Constitutional: No acute distress.   Head: Normocephalic, atraumatic  ENT: Oral mucosa is moist. No obvious pharyngeal exudates  Eyes: Pink conjunctiva, no scleral icterus  Neck: Supple, no lymphadenopathy  Cardiovascular: Normal rate and regular rhythm. S1,2 noted. No murmurs, gallops or rubs.  Pulmonary: Clear to auscultation bilaterally. No wheezes, rales or rhonchi  Abdominal: Soft, nontender, not distended, bowel sounds normoactive.  Musculoskeletal: No tenderness to palpation of chest wall.  Neurologic: Grossly nonfocal, moving all extremities.  Genitourinary: No gross hematuria  Skin: No obvious rash.  Psychiatric: Anxious, guarded  Labs Reviewed  Imaging reviewed       Labs  Lab Results   Component Value Date/Time    WBC 15.6* 04/13/2017 09:13 PM    RBC 4.52 04/13/2017 09:13 PM    HEMOGLOBIN 13.6 04/13/2017 09:13 PM    HEMATOCRIT 42.3 04/13/2017 09:13 PM    MCV 93.6 04/13/2017 09:13 PM    MCH 30.1 04/13/2017 09:13 PM    MCHC 32.2* 04/13/2017 09:13 PM    MPV 10.6 04/13/2017 09:13 PM    NEUTROPHILS-POLYS 81.80* 04/13/2017 09:13 PM    LYMPHOCYTES 11.10* 04/13/2017 09:13 PM    MONOCYTES 4.00 04/13/2017 09:13 PM    EOSINOPHILS 0.80 04/13/2017 09:13 PM    BASOPHILS 0.60 04/13/2017 09:13 PM      Lab Results   Component Value Date/Time    SODIUM 142  04/13/2017 09:13 PM    POTASSIUM 2.9* 04/13/2017 09:13 PM    CHLORIDE 107 04/13/2017 09:13 PM    CO2 11* 04/13/2017 09:13 PM    GLUCOSE 122* 04/13/2017 09:13 PM    BUN 6* 04/13/2017 09:13 PM    CREATININE 0.77 04/13/2017 09:13 PM         Lab Results   Component Value Date/Time    PT 16.1* 10/16/2015 09:08 PM    INR 1.30* 10/16/2015 09:08 PM        Imaging  Ct-head W/o    4/13/2017 4/13/2017 10:42 PM HISTORY/REASON FOR EXAM:  Seizure. Overdose on sleeping pills TECHNIQUE/EXAM DESCRIPTION AND NUMBER OF VIEWS: CT of the head without contrast. The study was performed on a helical multidetector CT scanner. Contiguous 2.5 mm axial sections were obtained from the skull base through the vertex. Up to date radiation dose reduction adjustments have been utilized to meet ALARA standards for radiation dose reduction. . COMPARISON:  10/16/2015 FINDINGS: Ventricular system is of normal size, shape, and position for age. There is no midline shift or mass effect. There is no acute hemorrhage. There is no change in a tiny low-density area in the right posterior occipital lobe white matter which may be a prominent perivascular space. The calvarium is intact. Paranasal sinuses in the field of view are unremarkable. Mastoids in the field of view are unremarkable.     4/13/2017  1.  There is no acute intracranial process.      Assessment and Plan  Admit to inpatient, telemetry    Drug overdose  IVF, observe on telemetry, check another Tylenol lvl in 4 hours.    Suicidal ideation  Suicidal precautions. ED placed legal hold. Obtain TSH to complete work-up. Will have attending to consult Psychiatry in the morning.    Anxiety  Hypertension  Tachycardia  PRN Ativan for anxiety, agitation and convulsion/tremors; PRN clonidine for tremors, tachycardia, hypertension    Leukocytosis  Likely reactive. Trend.    SCDs    I spent 71 minutes evaluating Lana Newby, reviewing the chart, vitals, labs and imaging, discussing the case with ED  physician, medication reconciliation, placing orders and enacting the plan above.

## 2017-04-15 PROBLEM — D72.829 LEUKOCYTOSIS: Status: ACTIVE | Noted: 2017-04-15

## 2017-04-15 PROBLEM — F41.9 ANXIETY: Status: ACTIVE | Noted: 2017-04-15

## 2017-04-15 LAB
ANION GAP SERPL CALC-SCNC: 4 MMOL/L (ref 0–11.9)
BASOPHILS # BLD AUTO: 0.5 % (ref 0–1.8)
BASOPHILS # BLD: 0.06 K/UL (ref 0–0.12)
BUN SERPL-MCNC: 5 MG/DL (ref 8–22)
CALCIUM SERPL-MCNC: 7.6 MG/DL (ref 8.5–10.5)
CHLORIDE SERPL-SCNC: 114 MMOL/L (ref 96–112)
CO2 SERPL-SCNC: 19 MMOL/L (ref 20–33)
CREAT SERPL-MCNC: 0.51 MG/DL (ref 0.5–1.4)
EOSINOPHIL # BLD AUTO: 0.16 K/UL (ref 0–0.51)
EOSINOPHIL NFR BLD: 1.3 % (ref 0–6.9)
ERYTHROCYTE [DISTWIDTH] IN BLOOD BY AUTOMATED COUNT: 48.6 FL (ref 35.9–50)
GFR SERPL CREATININE-BSD FRML MDRD: >60 ML/MIN/1.73 M 2
GLUCOSE SERPL-MCNC: 81 MG/DL (ref 65–99)
HCT VFR BLD AUTO: 35 % (ref 37–47)
HGB BLD-MCNC: 11.6 G/DL (ref 12–16)
IMM GRANULOCYTES # BLD AUTO: 0.03 K/UL (ref 0–0.11)
IMM GRANULOCYTES NFR BLD AUTO: 0.2 % (ref 0–0.9)
LYMPHOCYTES # BLD AUTO: 2.67 K/UL (ref 1–4.8)
LYMPHOCYTES NFR BLD: 20.9 % (ref 22–41)
MCH RBC QN AUTO: 30.5 PG (ref 27–33)
MCHC RBC AUTO-ENTMCNC: 33.1 G/DL (ref 33.6–35)
MCV RBC AUTO: 92.1 FL (ref 81.4–97.8)
MONOCYTES # BLD AUTO: 0.65 K/UL (ref 0–0.85)
MONOCYTES NFR BLD AUTO: 5.1 % (ref 0–13.4)
NEUTROPHILS # BLD AUTO: 9.22 K/UL (ref 2–7.15)
NEUTROPHILS NFR BLD: 72 % (ref 44–72)
NRBC # BLD AUTO: 0 K/UL
NRBC BLD AUTO-RTO: 0 /100 WBC
PLATELET # BLD AUTO: 211 K/UL (ref 164–446)
PMV BLD AUTO: 10.6 FL (ref 9–12.9)
POTASSIUM SERPL-SCNC: 4.1 MMOL/L (ref 3.6–5.5)
RBC # BLD AUTO: 3.8 M/UL (ref 4.2–5.4)
SODIUM SERPL-SCNC: 137 MMOL/L (ref 135–145)
WBC # BLD AUTO: 12.8 K/UL (ref 4.8–10.8)

## 2017-04-15 PROCEDURE — 770001 HCHG ROOM/CARE - MED/SURG/GYN PRIV*

## 2017-04-15 PROCEDURE — 700102 HCHG RX REV CODE 250 W/ 637 OVERRIDE(OP): Performed by: INTERNAL MEDICINE

## 2017-04-15 PROCEDURE — 36415 COLL VENOUS BLD VENIPUNCTURE: CPT

## 2017-04-15 PROCEDURE — A9270 NON-COVERED ITEM OR SERVICE: HCPCS | Performed by: INTERNAL MEDICINE

## 2017-04-15 PROCEDURE — 700111 HCHG RX REV CODE 636 W/ 250 OVERRIDE (IP): Performed by: INTERNAL MEDICINE

## 2017-04-15 PROCEDURE — 99232 SBSQ HOSP IP/OBS MODERATE 35: CPT | Performed by: INTERNAL MEDICINE

## 2017-04-15 PROCEDURE — 80048 BASIC METABOLIC PNL TOTAL CA: CPT

## 2017-04-15 PROCEDURE — 85025 COMPLETE CBC W/AUTO DIFF WBC: CPT

## 2017-04-15 PROCEDURE — 700105 HCHG RX REV CODE 258: Performed by: INTERNAL MEDICINE

## 2017-04-15 RX ADMIN — FAMOTIDINE 20 MG: 20 TABLET, FILM COATED ORAL at 09:56

## 2017-04-15 RX ADMIN — STANDARDIZED SENNA CONCENTRATE AND DOCUSATE SODIUM 2 TABLET: 8.6; 5 TABLET, FILM COATED ORAL at 09:56

## 2017-04-15 RX ADMIN — POTASSIUM CHLORIDE 40 MEQ: 1.5 POWDER, FOR SOLUTION ORAL at 00:34

## 2017-04-15 RX ADMIN — FAMOTIDINE 20 MG: 20 TABLET, FILM COATED ORAL at 22:03

## 2017-04-15 RX ADMIN — SODIUM CHLORIDE: 9 INJECTION, SOLUTION INTRAVENOUS at 05:39

## 2017-04-15 RX ADMIN — CALCIUM GLUCONATE 2 G: 94 INJECTION, SOLUTION INTRAVENOUS at 11:09

## 2017-04-15 ASSESSMENT — PAIN SCALES - GENERAL
PAINLEVEL_OUTOF10: 0

## 2017-04-15 ASSESSMENT — ENCOUNTER SYMPTOMS
FEVER: 0
ABDOMINAL PAIN: 0
VOMITING: 0
CHILLS: 0
NAUSEA: 0
COUGH: 0

## 2017-04-15 NOTE — PROGRESS NOTES
Hospital Medicine Progress Note, Adult, Complex               Author: Shawn Benedict Date & Time created: 4/15/2017  4:54 PM     Interval History:  22 yr old with suicide attempt with drug overdose    4/15 Remains active suicidal. Leukocytosis improving. No seizures. Vitals stable. Denies CP, SOB, HA, fever or abdominal pain. Psych on board. Ok to transfer to Inpatient psych from medical standpoint.     Review of Systems:  Review of Systems   Constitutional: Negative for fever and chills.   Respiratory: Negative for cough.    Cardiovascular: Negative for chest pain.   Gastrointestinal: Negative for nausea, vomiting and abdominal pain.   Genitourinary: Negative for dysuria.   All other systems reviewed and are negative.      Physical Exam:  Physical Exam   Constitutional: She is oriented to person, place, and time. She appears well-nourished.   HENT:   Head: Atraumatic.   Mouth/Throat: Oropharynx is clear and moist.   Eyes: Conjunctivae are normal.   Neck: Neck supple.   Cardiovascular: Normal rate and regular rhythm.    Pulmonary/Chest: Effort normal and breath sounds normal. No respiratory distress. She has no wheezes. She has no rales.   Abdominal: Soft. Bowel sounds are normal. She exhibits no distension. There is no tenderness.   Musculoskeletal: Normal range of motion. She exhibits no edema.   Neurological: She is alert and oriented to person, place, and time.   Skin: Skin is warm and dry.   Psychiatric: She is withdrawn. She exhibits a depressed mood. She expresses suicidal ideation.   Nursing note and vitals reviewed.      Labs:        Invalid input(s): [id number]      Recent Labs      04/13/17 2113 04/14/17   0348  04/15/17   0230   SODIUM  142  140  137   POTASSIUM  2.9*  3.0*  4.1   CHLORIDE  107  107  114*   CO2  11*  20  19*   BUN  6*  5*  5*   CREATININE  0.77  0.59  0.51   MAGNESIUM   --   2.0   --    PHOSPHORUS   --   2.5   --    CALCIUM  8.4*  8.2*  7.6*     Recent Labs      04/13/17 2113   178  04/15/17   0230   ALTSGPT  13   --    --    ASTSGOT  20   --    --    ALKPHOSPHAT  50   --    --    TBILIRUBIN  0.2   --    --    GLUCOSE  122*  86  81     Recent Labs      178  04/15/17   0230   RBC  4.52  4.61  3.80*   HEMOGLOBIN  13.6  13.8  11.6*   HEMATOCRIT  42.3  41.4  35.0*   PLATELETCT  268  250  211     Recent Labs      178  04/15/17   0230   WBC  15.6*  17.7*  12.8*   NEUTSPOLYS  81.80*   --   72.00   LYMPHOCYTES  11.10*   --   20.90*   MONOCYTES  4.00   --   5.10   EOSINOPHILS  0.80   --   1.30   BASOPHILS  0.60   --   0.50   ASTSGOT  20   --    --    ALTSGPT  13   --    --    ALKPHOSPHAT  50   --    --    TBILIRUBIN  0.2   --    --            Hemodynamics:  Temp (24hrs), Av.9 °C (98.4 °F), Min:36.3 °C (97.3 °F), Max:37.2 °C (99 °F)  Temperature: 36.8 °C (98.3 °F)  Pulse  Av.1  Min: 62  Max: 132  Blood Pressure: 112/68 mmHg     Respiratory:    Respiration: 19, Pulse Oximetry: 95 %        RUL Breath Sounds: Clear, RML Breath Sounds: Clear, RLL Breath Sounds: Clear, LINNETTE Breath Sounds: Clear, LLL Breath Sounds: Clear  Fluids:    Intake/Output Summary (Last 24 hours) at 04/15/17 1654  Last data filed at 17 2200   Gross per 24 hour   Intake    800 ml   Output      0 ml   Net    800 ml     Weight: 56.4 kg (124 lb 5.4 oz)  GI/Nutrition:  Orders Placed This Encounter   Procedures   • Diet Order     Standing Status: Standing      Number of Occurrences: 1      Standing Expiration Date:      Order Specific Question:  Diet:     Answer:  Regular [1]     Medical Decision Making, by Problem:  Active Hospital Problems    Diagnosis   • Suicidal ideation [R45.851]  -active  -psych on board  -suicide precautions and sitter at bedside   • Drug overdose [T50.311A]  unknown drug, mom thinks its etodolacNSAID  -denies abdominal pain or bleeding  -AG metabolic acidosis resolved.   • Leukocytosis [D72.829]  -likely reactive, no fever   •  Anxiety [F41.9]  -ativan PRN     Patient plan of care discussed at multidisplinary team rounds and with patient and R.N at beside.    Disposition: Ok to transfer to Medical or discharge to inpatient psych facility    Labs reviewed, Medications reviewed and Radiology images reviewed          DVT prophylaxis - mechanical: SCDs

## 2017-04-15 NOTE — PROGRESS NOTES
1:1 sitter at bedside. Received report from previous nurse regarding prior 12 hours.  POC reviewed with pt, white board updated, pt verbalized understanding, call light within reach.  Pt tolerated evening meds.

## 2017-04-15 NOTE — PROGRESS NOTES
Patient seen and examined. Suicide attempt with OD of unknown pills, thought to be etodolac by mother. Psych has been consulted. Continue IVF and electrolyte replacement. Monitor in tele overnight.

## 2017-04-15 NOTE — PSYCHIATRY
Full note to follow. Pt is still actively suicidal. Poor historian, not willing to talk too much.     Legal hold extended.

## 2017-04-15 NOTE — CARE PLAN
Problem: Safety  Goal: Will remain free from injury  Outcome: PROGRESSING SLOWER THAN EXPECTED  Discussed with patient the importance of contacting us for assistance prior to getting out of bed in order to help prevent any falls. Patient understanding of the information. All questions answered at this time.     Problem: Psychosocial Needs:  Goal: Level of anxiety will decrease  Outcome: PROGRESSING SLOWER THAN EXPECTED    Problem: Medication  Goal: Compliance with prescribed medication will improve  Intervention: Assess and address patient’s barriers to compliance with prescribed medication regimen  Discussed the current medications being given with the patient and discussed why the medications were being given. Patient understanding of the information. All questions answered at this time.

## 2017-04-15 NOTE — PROGRESS NOTES
Received report from night RN. Assumed care of patient. Patient A&O x 4. Pt is on room air. Patient is resting in bed with no family present at this time. Pt has 18g IV in left forearm saline locked. Pt declines pain at this time. Strip alarm in place. Bed locked and in lowest position.

## 2017-04-16 LAB
ANION GAP SERPL CALC-SCNC: 6 MMOL/L (ref 0–11.9)
BASOPHILS # BLD AUTO: 0.8 % (ref 0–1.8)
BASOPHILS # BLD: 0.07 K/UL (ref 0–0.12)
BUN SERPL-MCNC: 3 MG/DL (ref 8–22)
CALCIUM SERPL-MCNC: 8.1 MG/DL (ref 8.5–10.5)
CHLORIDE SERPL-SCNC: 111 MMOL/L (ref 96–112)
CO2 SERPL-SCNC: 24 MMOL/L (ref 20–33)
CREAT SERPL-MCNC: 0.48 MG/DL (ref 0.5–1.4)
EOSINOPHIL # BLD AUTO: 0.58 K/UL (ref 0–0.51)
EOSINOPHIL NFR BLD: 6.4 % (ref 0–6.9)
ERYTHROCYTE [DISTWIDTH] IN BLOOD BY AUTOMATED COUNT: 47.3 FL (ref 35.9–50)
GFR SERPL CREATININE-BSD FRML MDRD: >60 ML/MIN/1.73 M 2
GLUCOSE SERPL-MCNC: 148 MG/DL (ref 65–99)
HCT VFR BLD AUTO: 34.5 % (ref 37–47)
HGB BLD-MCNC: 11.3 G/DL (ref 12–16)
IMM GRANULOCYTES # BLD AUTO: 0.02 K/UL (ref 0–0.11)
IMM GRANULOCYTES NFR BLD AUTO: 0.2 % (ref 0–0.9)
LYMPHOCYTES # BLD AUTO: 2.85 K/UL (ref 1–4.8)
LYMPHOCYTES NFR BLD: 31.2 % (ref 22–41)
MCH RBC QN AUTO: 29.7 PG (ref 27–33)
MCHC RBC AUTO-ENTMCNC: 32.8 G/DL (ref 33.6–35)
MCV RBC AUTO: 90.6 FL (ref 81.4–97.8)
MONOCYTES # BLD AUTO: 0.55 K/UL (ref 0–0.85)
MONOCYTES NFR BLD AUTO: 6 % (ref 0–13.4)
NEUTROPHILS # BLD AUTO: 5.06 K/UL (ref 2–7.15)
NEUTROPHILS NFR BLD: 55.4 % (ref 44–72)
NRBC # BLD AUTO: 0 K/UL
NRBC BLD AUTO-RTO: 0 /100 WBC
PLATELET # BLD AUTO: 212 K/UL (ref 164–446)
PMV BLD AUTO: 10.3 FL (ref 9–12.9)
POTASSIUM SERPL-SCNC: 3.3 MMOL/L (ref 3.6–5.5)
RBC # BLD AUTO: 3.81 M/UL (ref 4.2–5.4)
SODIUM SERPL-SCNC: 141 MMOL/L (ref 135–145)
WBC # BLD AUTO: 9.1 K/UL (ref 4.8–10.8)

## 2017-04-16 PROCEDURE — A9270 NON-COVERED ITEM OR SERVICE: HCPCS | Performed by: INTERNAL MEDICINE

## 2017-04-16 PROCEDURE — 700102 HCHG RX REV CODE 250 W/ 637 OVERRIDE(OP): Performed by: INTERNAL MEDICINE

## 2017-04-16 PROCEDURE — 80048 BASIC METABOLIC PNL TOTAL CA: CPT

## 2017-04-16 PROCEDURE — 700102 HCHG RX REV CODE 250 W/ 637 OVERRIDE(OP): Performed by: NURSE PRACTITIONER

## 2017-04-16 PROCEDURE — 36415 COLL VENOUS BLD VENIPUNCTURE: CPT

## 2017-04-16 PROCEDURE — 99232 SBSQ HOSP IP/OBS MODERATE 35: CPT | Performed by: INTERNAL MEDICINE

## 2017-04-16 PROCEDURE — 85025 COMPLETE CBC W/AUTO DIFF WBC: CPT

## 2017-04-16 PROCEDURE — A9270 NON-COVERED ITEM OR SERVICE: HCPCS | Performed by: NURSE PRACTITIONER

## 2017-04-16 PROCEDURE — 700105 HCHG RX REV CODE 258: Performed by: INTERNAL MEDICINE

## 2017-04-16 PROCEDURE — 770001 HCHG ROOM/CARE - MED/SURG/GYN PRIV*

## 2017-04-16 RX ORDER — NICOTINE 21 MG/24HR
1 PATCH, TRANSDERMAL 24 HOURS TRANSDERMAL
Status: DISCONTINUED | OUTPATIENT
Start: 2017-04-16 | End: 2017-04-19 | Stop reason: HOSPADM

## 2017-04-16 RX ADMIN — NICOTINE 1 PATCH: 21 PATCH, EXTENDED RELEASE TRANSDERMAL at 20:42

## 2017-04-16 RX ADMIN — FAMOTIDINE 20 MG: 20 TABLET, FILM COATED ORAL at 08:03

## 2017-04-16 RX ADMIN — FAMOTIDINE 20 MG: 20 TABLET, FILM COATED ORAL at 20:42

## 2017-04-16 RX ADMIN — SODIUM CHLORIDE: 9 INJECTION, SOLUTION INTRAVENOUS at 05:00

## 2017-04-16 RX ADMIN — STANDARDIZED SENNA CONCENTRATE AND DOCUSATE SODIUM 2 TABLET: 8.6; 5 TABLET, FILM COATED ORAL at 08:03

## 2017-04-16 RX ADMIN — SODIUM CHLORIDE: 9 INJECTION, SOLUTION INTRAVENOUS at 17:10

## 2017-04-16 ASSESSMENT — PAIN SCALES - GENERAL
PAINLEVEL_OUTOF10: 0

## 2017-04-16 NOTE — CARE PLAN
Problem: Communication  Goal: The ability to communicate needs accurately and effectively will improve  Intervention: Educate patient and significant other/support system about the plan of care, procedures, treatments, medications and allow for questions  Discussed with patient POC for the evening. Updated white board. Pt verbalized understanding

## 2017-04-16 NOTE — PROGRESS NOTES
Pt in direct obs. Discussed with patient desire to go home. Pt understands need to verbalize feelings to MD in the am. Pt resting comfortably in bed. Up to bathroom without assistance. SCD's in place and snack given

## 2017-04-16 NOTE — CARE PLAN
Problem: Safety  Goal: Will remain free from falls  Discussed with patient use of call light and phone to reach RN and CNA. Pt verbalized understanding about safety

## 2017-04-16 NOTE — CARE PLAN
Problem: Communication  Goal: The ability to communicate needs accurately and effectively will improve  Intervention: Educate patient and significant other/support system about the plan of care, procedures, treatments, medications and allow for questions  Discussed with patient POC, white board updated; pt verbalized understanding      Problem: Safety  Goal: Will remain free from falls  Discussed with patient use of call light and phone to reach RN and CNA. Pt verbalized understanding about safety

## 2017-04-16 NOTE — PSYCHIATRY
PSYCHIATRIC CONSULTATION:  Reason for Admission: Drug overdose, Suicidal ideation  Reason for Consult: SI legal hold  Requesting Provider: Shawn Benedict M.D.  Psychiatric Supervising Attending: Rj Adam MD  Legal Hold Status: +  Chief Complaint   Patient presents with   • Drug Overdose   • Depression   • Suicidal Ideation       History of Present Illness:  Patient is a 22 y.o. Female, with no reported past psychiatric history who presents to the [facility] ED following a drug overdose and suicide attempt.  Psychiatry was consulted for said reasons.  Patient reported that her boyfriend of 4-years went to see her and had brought along his new girlfriend.  Patient denied having any issues in her relationship and did not detect any signs of problems prior to this.  Patient stated that she felt heartbroken and felt alone/isolated.  She reported taking an unknown quantity of sleeping pills at home with the intention of ending her own life.  Patient's mother attempted to make her vomit by drinking vinegar and sticking fingers down throat.  Mother had reported witnessing a seizure episode at home.  In addition, patient had a witnessed seizure for 30 seconds en route to the ED, as per EMS.  Patient stated that this was her first suicide attempt; however, mother reported previous history of suicide ideation and ED Provider on 4/13/17 noted a history of previous suicide attempt.  Patient denied having planned the suicide and stated that it was an impulsive action.  She currently denies having any suicide ideation.  Patient stated that she would like to go back to work to get her mind off the breakup.  Remains very tearful when describing events and unable to state plan if she begins to feel sad/depressed.      Psychiatric Review of Current Symptoms:  Depression - endorses: feeling sad, excessive guilt, some worthlessness; denies: anhedonia, decreased energy, difficulty concentrating, changes in appetite, changes in sleep,  suicide; observed: no psychomotor retardation  Ann-Marie - denies distinct periods of grandiosity, decreased need for sleep, flight of ideas, talkativeness  Anxiety - denies  Self-Harming Behavior - denies history of cutting  Psychosis - denies history of hallucinations and paranoia      Medical Review of Symptoms: (as reported by the patient). All systems reviewed. Those not listed below were found to be negative.   Neurological - denies history of TBI, LOC, strokes, and seizures prior to current admission      Medical History as documented.  All the vitals, labs, notes, records, and the MAR.  Findings of interest to psychiatry include:  Allergy - Amoxicillin; Bactrim ds; and Other drug  Past Medical History   Diagnosis Date   • Scoliosis      Prior to Admission medications    Not on File     Current Facility-Administered Medications   Medication Dose Route Frequency Provider Last Rate Last Dose   • senna-docusate (PERICOLACE or SENOKOT S) 8.6-50 MG per tablet 2 Tab  2 Tab Oral BID Deyvi Dozier M.D.   2 Tab at 04/16/17 0803    And   • polyethylene glycol/lytes (MIRALAX) PACKET 1 Packet  1 Packet Oral QDAY PRN Deyvi Dozier M.D.        And   • magnesium hydroxide (MILK OF MAGNESIA) suspension 30 mL  30 mL Oral QDAY PRN Deyvi Dozier M.D.        And   • bisacodyl (DULCOLAX) suppository 10 mg  10 mg Rectal QDAY PRN Deyvi Dozier M.D.       • Respiratory Care per Protocol   Nebulization Continuous RT Deyvi Dozier M.D.       • NS infusion   Intravenous Continuous Shawn Benedict M.D. 83 mL/hr at 04/16/17 0500     • lorazepam (ATIVAN) injection 1 mg  1 mg Intravenous Q4HRS PRN Deyvi Dozier M.D.       • clonidine (CATAPRES) tablet 0.1 mg  0.1 mg Oral Q8HRS PRN Deyvi Dozier M.D.       • famotidine (PEPCID) tablet 20 mg  20 mg Oral BID Shawn Benedict M.D.   20 mg at 04/16/17 0803     Past Surgical History   Procedure Laterality Date   • Other       scoliosis sx    • Skeletal       Scoliosis Repair     Laboratory -     CBC WITH DIFFERENTIAL (Order #[id number]) on 4/15/17            CBC WITH DIFFERENTIAL (Order [id number])         Component Results      Component Value Ref Range & Units Status     WBC 9.1 4.8 - 10.8 K/uL Final     RBC 3.81 (L) 4.20 - 5.40 M/uL Final     Hemoglobin 11.3 (L) 12.0 - 16.0 g/dL Final     Hematocrit 34.5 (L) 37.0 - 47.0 % Final     MCV 90.6 81.4 - 97.8 fL Final     MCH 29.7 27.0 - 33.0 pg Final     MCHC 32.8 (L) 33.6 - 35.0 g/dL Final     RDW 47.3 35.9 - 50.0 fL Final     Platelet Count 212 164 - 446 K/uL Final     MPV 10.3 9.0 - 12.9 fL Final     Neutrophils-Polys 55.40 44.00 - 72.00 % Final     Lymphocytes 31.20 22.00 - 41.00 % Final     Monocytes 6.00 0.00 - 13.40 % Final     Eosinophils 6.40 0.00 - 6.90 % Final     Basophils 0.80 0.00 - 1.80 % Final     Immature Granulocytes 0.20 0.00 - 0.90 % Final     Nucleated RBC 0.00 /100 WBC Final     Neutrophils (Absolute) 5.06 2.00 - 7.15 K/uL Final     Comment:      Includes immature neutrophils, if present.     Lymphs (Absolute) 2.85 1.00 - 4.80 K/uL Final     Monos (Absolute) 0.55 0.00 - 0.85 K/uL Final     Eos (Absolute) 0.58 (H) 0.00 - 0.51 K/uL Final     Baso (Absolute) 0.07 0.00 - 0.12 K/uL Final     Immature Granulocytes (abs) 0.02 0.00 - 0.11 K/uL Final     NRBC (Absolute) 0.00 K/uL Final       BASIC METABOLIC PANEL (Order #[id number]) on 4/15/17       Component Results      Component Value Ref Range & Units Status     Sodium 141 135 - 145 mmol/L Final     Potassium 3.3 (L) 3.6 - 5.5 mmol/L Final     Chloride 111 96 - 112 mmol/L Final     Co2 24 20 - 33 mmol/L Final     Glucose 148 (H) 65 - 99 mg/dL Final     Bun 3 (L) 8 - 22 mg/dL Final     Creatinine 0.48 (L) 0.50 - 1.40 mg/dL Final     Calcium 8.1 (L) 8.5 - 10.5 mg/dL Final     Anion Gap 6.0 0.0 - 11.9 Final       URINE DRUG SCREEN (TRIAGE) (Order #[id number]) on 4/13/17            URINE DRUG SCREEN (TRIAGE) (Order [id number])         Component Results      Component Value Ref Range & Units Status      Amphetamines Urine Negative Negative Final     Barbiturates Negative Negative Final     Benzodiazepines Negative Negative Final     Cocaine Metabolite Negative Negative Final     Methadone Negative Negative Final     Ecstasy Negative Negative Final     Opiates Negative Negative Final     Oxycodone Negative Negative Final     Phencyclidine -Pcp Negative Negative Final     Propoxyphene Negative Negative Final     Cannabinoid Metab Positive (A) Negative Final       Blood Alcohol (Order #[id number]) on 4/13/17            Blood Alcohol (Order [id number])         Component Results      Component Value Ref Range & Units Status     Diagnostic Alcohol 0.00 0.00 g/dL Final     Acetaminophen Level (Order #[id number]) on 4/13/17       Component Results      Component Value Ref Range & Units Status     Acetomenophen -Tylenol 27 10 - 30 ug/mL Final      SALICYLATE LEVEL (Order #[id number]) on 4/13/17       Component Results      Component Value Ref Range & Units Status     Salicylates, Quant. 0 (L) 15 - 25 mg/dL Final      TSH (Order #[id number]) on 4/14/17       Component Results      Component Value Ref Range & Units Status     TSH 0.630 0.300 - 3.700 uIU/mL Final     MAGNESIUM (Order #[id number]) on 4/14/17       Component Results      Component Value Ref Range & Units Status     Magnesium 2.0 1.5 - 2.5 mg/dL Final      HCG QUAL SERUM (Order #[id number]) on 4/13/17       Component Results      Component Value Ref Range & Units Status     Beta-Hcg Qualitative Serum Negative Negative Final     Imaging - (4/13/17) CT-HEAD W/O  HISTORY/REASON FOR EXAM:  Seizure. Overdose on sleeping pills  1.  There is no acute intracranial process.    EEG/Tests - none on current admission  EKG - (4/13/17) SINUS TACHYCARDIA, QTc: 400      Past Psychiatric History:  Diagnosis - denies  Psychotropic Medication - denies  Mental Health Care Provider - denies  Hospitalization - denies  Previous Suicidal or Homicidal Ideations - denies, but noted in ED  "Provider note on 4/13/17 to have history of previous suicide attempt  Previous Self-Harming Behavior - denies      Family History:  Denies any substance abuse, psychiatric diagnosis, suicides      Psychosocial History:  Living - with mother and father in [facility], has been there over 8 years  Relationships - boyfriend of 4 years broke up with patient prior to admission  Siblings - 2 brothers, 1 older, 1 younger  Education - [employer], semester completed in Feb 2017  Occupation - [employer], planned promotion to become supervisor, 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  Social - enjoys drawing, reading, movies  Trauma - not obtained      Substance Use:  Nicotine - smokes 3 cigarettes / day  Alcohol - denies  Other - denies other illicit substance use; however, UDS noted to be +ve for cannabinoids      Mental Status Examination:  Vitals - Blood Pressure: 114/74 mmHg, Pulse: 71, Respiration: 20, Temperature: 36.1 °C (97 °F), Weight: 55.4 kg (122 lb 2.2 oz), Pulse Oximetry: 95 %, O2 (LPM): 0, O2 Delivery: None (Room Air)  Female, appears as stated age, thin framed, fair grooming/hygiene, tattoo right upper arm, moving head, neck, trunk, B/L U/LE, no gross lateralizing neurological deficits, tearful prior to entering room and throughout encounter, cooperative, fair eye contact, no psychomotor agitation/retardation, no abnormal mannerisms or tics, speech with regular rate/rhythm/volume, no increased latency, mood \"I'm getting over it, I'm still sad\", affect stable, dysphoric, restricted, congruent with stated mood, thought process logical, linear, goal-directed, denies any current SI, no suicidal or homicidal behaviors, denies A/VH, does not appear to be responding to internal stimuli, no paranoia/delusions; AOx4, immediate recall intact, delayed recall 3/3, serial 7's 2/5 correct, serial 3's 5/5 correct, able to spell world backwards, abstraction intact, neurological testing not done, fair " insight/judgement      Assessment:  Psychiatric:  - Adjustment disorder with depressed mood    Medical:  Active Problems:    Suicidal ideation    Drug overdose    Leukocytosis    Anxiety      Plan:  - Legal Hold Status: extend; will re-evaluate tomorrow to determine if patient will be safe to discharge home.  Will benefit from outpatient therapy to work on coping skills.  - Capacity: not formally evaluated  - Medications: No psychotropic medications at this time  - Orders: Patient may have family visitors and access to personal phone; provide community resources for outpatient therapy  - Collateral: not obtained  - Will follow  - Thank you for consult

## 2017-04-17 LAB — EKG IMPRESSION: NORMAL

## 2017-04-17 PROCEDURE — 700102 HCHG RX REV CODE 250 W/ 637 OVERRIDE(OP): Performed by: INTERNAL MEDICINE

## 2017-04-17 PROCEDURE — A9270 NON-COVERED ITEM OR SERVICE: HCPCS | Performed by: NURSE PRACTITIONER

## 2017-04-17 PROCEDURE — 700105 HCHG RX REV CODE 258: Performed by: INTERNAL MEDICINE

## 2017-04-17 PROCEDURE — 99232 SBSQ HOSP IP/OBS MODERATE 35: CPT | Performed by: INTERNAL MEDICINE

## 2017-04-17 PROCEDURE — 700111 HCHG RX REV CODE 636 W/ 250 OVERRIDE (IP): Performed by: NURSE PRACTITIONER

## 2017-04-17 PROCEDURE — 700102 HCHG RX REV CODE 250 W/ 637 OVERRIDE(OP): Performed by: NURSE PRACTITIONER

## 2017-04-17 PROCEDURE — A9270 NON-COVERED ITEM OR SERVICE: HCPCS | Performed by: INTERNAL MEDICINE

## 2017-04-17 PROCEDURE — 770001 HCHG ROOM/CARE - MED/SURG/GYN PRIV*

## 2017-04-17 RX ORDER — ONDANSETRON 2 MG/ML
4 INJECTION INTRAMUSCULAR; INTRAVENOUS EVERY 6 HOURS PRN
Status: DISCONTINUED | OUTPATIENT
Start: 2017-04-17 | End: 2017-04-19 | Stop reason: HOSPADM

## 2017-04-17 RX ADMIN — ONDANSETRON 4 MG: 2 INJECTION INTRAMUSCULAR; INTRAVENOUS at 00:33

## 2017-04-17 RX ADMIN — FAMOTIDINE 20 MG: 20 TABLET, FILM COATED ORAL at 20:09

## 2017-04-17 RX ADMIN — FAMOTIDINE 20 MG: 20 TABLET, FILM COATED ORAL at 10:38

## 2017-04-17 RX ADMIN — SODIUM CHLORIDE: 9 INJECTION, SOLUTION INTRAVENOUS at 23:53

## 2017-04-17 RX ADMIN — ONDANSETRON 4 MG: 2 INJECTION INTRAMUSCULAR; INTRAVENOUS at 20:09

## 2017-04-17 RX ADMIN — NICOTINE 1 PATCH: 21 PATCH, EXTENDED RELEASE TRANSDERMAL at 20:09

## 2017-04-17 RX ADMIN — SODIUM CHLORIDE: 9 INJECTION, SOLUTION INTRAVENOUS at 12:07

## 2017-04-17 ASSESSMENT — ENCOUNTER SYMPTOMS
NAUSEA: 0
VOMITING: 0
CHILLS: 0
FEVER: 0
COUGH: 0
ABDOMINAL PAIN: 0

## 2017-04-17 ASSESSMENT — PAIN SCALES - GENERAL
PAINLEVEL_OUTOF10: 0

## 2017-04-17 NOTE — CARE PLAN
Problem: Safety  Goal: Will remain free from falls  Outcome: PROGRESSING AS EXPECTED  Pt teaching given regarding safety and fall precautions; pt verbalized understanding; bed in lowest position; treaded socks on; pt AAOx4; gait steady, no assistance needed; no complaints of dizziness. Close Observation ion place; sitter present.        Problem: Knowledge Deficit  Goal: Knowledge of disease process/condition, treatment plan, diagnostic tests, and medications will improve  Outcome: PROGRESSING AS EXPECTED  Pt teaching given about unit routine, medications, activity, plan of care discussed.

## 2017-04-17 NOTE — PROGRESS NOTES
Received report from night RN Nate.  Resumed care. Patient is A+Ox4. Patient assessed and steady on feet. Patient wishes not to use bed alarm.  Patient is educated on the use of the call light and calls appropritaly. Bed is in the lowest position. Patient is resting comfortably in bed and has no complaint of pain. Patient is medical and not on tele box.

## 2017-04-17 NOTE — PROGRESS NOTES
Bedside report received from YOBANY Fuentes; assumed pt care; assessment complete; pt in bed watching TV; pt alert and oriented x4; pt denies pain or shortness of breath; plan of care discussed; pt verbalized understanding; pt reported that she smokes 1/2 pack a day and asked for a nicotine patch; will ask MD; call light within reach; bed in lowest position; will continue to monitor.

## 2017-04-17 NOTE — CARE PLAN
Problem: Communication  Goal: The ability to communicate needs accurately and effectively will improve  Outcome: PROGRESSING AS EXPECTED  Patient's white board is updated. Patient is updated on plan of care. All questions were answered.     Problem: Infection  Goal: Will remain free from infection  Outcome: PROGRESSING AS EXPECTED  Monitoring patient for signs and symptoms of infection. Monitoring lab values for signs of infection. Assessed potential routes for infection and for signs of infection.

## 2017-04-18 LAB
ANION GAP SERPL CALC-SCNC: 8 MMOL/L (ref 0–11.9)
BASOPHILS # BLD AUTO: 0 % (ref 0–1.8)
BASOPHILS # BLD: 0 K/UL (ref 0–0.12)
BUN SERPL-MCNC: 5 MG/DL (ref 8–22)
CALCIUM SERPL-MCNC: 8.4 MG/DL (ref 8.5–10.5)
CHLORIDE SERPL-SCNC: 110 MMOL/L (ref 96–112)
CO2 SERPL-SCNC: 25 MMOL/L (ref 20–33)
CREAT SERPL-MCNC: 0.51 MG/DL (ref 0.5–1.4)
EOSINOPHIL # BLD AUTO: 0.36 K/UL (ref 0–0.51)
EOSINOPHIL NFR BLD: 4.3 % (ref 0–6.9)
ERYTHROCYTE [DISTWIDTH] IN BLOOD BY AUTOMATED COUNT: 45.1 FL (ref 35.9–50)
GFR SERPL CREATININE-BSD FRML MDRD: >60 ML/MIN/1.73 M 2
GLUCOSE SERPL-MCNC: 95 MG/DL (ref 65–99)
HCT VFR BLD AUTO: 37.3 % (ref 37–47)
HGB BLD-MCNC: 12 G/DL (ref 12–16)
LYMPHOCYTES # BLD AUTO: 2.09 K/UL (ref 1–4.8)
LYMPHOCYTES NFR BLD: 25.2 % (ref 22–41)
MANUAL DIFF BLD: NORMAL
MCH RBC QN AUTO: 29.3 PG (ref 27–33)
MCHC RBC AUTO-ENTMCNC: 32.2 G/DL (ref 33.6–35)
MCV RBC AUTO: 91 FL (ref 81.4–97.8)
MONOCYTES # BLD AUTO: 0.51 K/UL (ref 0–0.85)
MONOCYTES NFR BLD AUTO: 6.1 % (ref 0–13.4)
MORPHOLOGY BLD-IMP: NORMAL
NEUTROPHILS # BLD AUTO: 5.35 K/UL (ref 2–7.15)
NEUTROPHILS NFR BLD: 64.4 % (ref 44–72)
NRBC # BLD AUTO: 0 K/UL
NRBC BLD AUTO-RTO: 0 /100 WBC
PLATELET # BLD AUTO: 231 K/UL (ref 164–446)
PLATELET BLD QL SMEAR: NORMAL
PMV BLD AUTO: 10.6 FL (ref 9–12.9)
POTASSIUM SERPL-SCNC: 3.3 MMOL/L (ref 3.6–5.5)
RBC # BLD AUTO: 4.1 M/UL (ref 4.2–5.4)
RBC BLD AUTO: NORMAL
SODIUM SERPL-SCNC: 143 MMOL/L (ref 135–145)
WBC # BLD AUTO: 8.3 K/UL (ref 4.8–10.8)

## 2017-04-18 PROCEDURE — A9270 NON-COVERED ITEM OR SERVICE: HCPCS | Performed by: INTERNAL MEDICINE

## 2017-04-18 PROCEDURE — 700102 HCHG RX REV CODE 250 W/ 637 OVERRIDE(OP): Performed by: INTERNAL MEDICINE

## 2017-04-18 PROCEDURE — 85007 BL SMEAR W/DIFF WBC COUNT: CPT

## 2017-04-18 PROCEDURE — 99232 SBSQ HOSP IP/OBS MODERATE 35: CPT | Performed by: INTERNAL MEDICINE

## 2017-04-18 PROCEDURE — 80048 BASIC METABOLIC PNL TOTAL CA: CPT

## 2017-04-18 PROCEDURE — 770001 HCHG ROOM/CARE - MED/SURG/GYN PRIV*

## 2017-04-18 PROCEDURE — 85027 COMPLETE CBC AUTOMATED: CPT

## 2017-04-18 PROCEDURE — 36415 COLL VENOUS BLD VENIPUNCTURE: CPT

## 2017-04-18 RX ADMIN — STANDARDIZED SENNA CONCENTRATE AND DOCUSATE SODIUM 2 TABLET: 8.6; 5 TABLET, FILM COATED ORAL at 11:16

## 2017-04-18 RX ADMIN — FAMOTIDINE 20 MG: 20 TABLET, FILM COATED ORAL at 11:16

## 2017-04-18 ASSESSMENT — PAIN SCALES - GENERAL
PAINLEVEL_OUTOF10: 0

## 2017-04-18 ASSESSMENT — ENCOUNTER SYMPTOMS
ABDOMINAL PAIN: 0
VOMITING: 0
NAUSEA: 0
SHORTNESS OF BREATH: 0

## 2017-04-18 NOTE — PROGRESS NOTES
Pt lying on bed; with eyes closed; no signs or symptoms of distress; pt turns and repositions self in bed; safety measures in place.

## 2017-04-18 NOTE — DISCHARGE PLANNING
Medical Social Work    SW faxed pt's legal hold medical clearance to  , Justina, so pt may attend court tomorrow.    Plan: Pt will attend court tomorrow, 4/18/17, for extension of legal hold.

## 2017-04-18 NOTE — PROGRESS NOTES
Hospital Medicine Progress Note, Adult, Complex               Author: Shawn Marichuy        Interval History:  22 yr old with suicide attempt with drug overdose    4/15 Remains active suicidal. Leukocytosis improving. No seizures. Vitals stable. Denies CP, SOB, HA, fever or abdominal pain. Psych on board. Ok to transfer to Inpatient psych from medical standpoint.     4/16 No acute events. Denies SI. Awaiting psych recommendations. Cleared from medical standpoint. Transfer to medical.     Review of Systems:  Review of Systems   Constitutional: Negative for fever and chills.   Respiratory: Negative for cough.    Cardiovascular: Negative for chest pain.   Gastrointestinal: Negative for nausea, vomiting and abdominal pain.   Genitourinary: Negative for dysuria.   All other systems reviewed and are negative.      Physical Exam:  Physical Exam   Constitutional: She is oriented to person, place, and time. She appears well-nourished.   HENT:   Head: Atraumatic.   Mouth/Throat: Oropharynx is clear and moist.   Eyes: Conjunctivae are normal.   Neck: Neck supple.   Cardiovascular: Normal rate and regular rhythm.    Pulmonary/Chest: Effort normal and breath sounds normal. No respiratory distress. She has no wheezes. She has no rales.   Abdominal: Soft. Bowel sounds are normal. She exhibits no distension. There is no tenderness.   Musculoskeletal: Normal range of motion. She exhibits no edema.   Neurological: She is alert and oriented to person, place, and time.   Skin: Skin is warm and dry.   Psychiatric: She is withdrawn. She exhibits a depressed mood. She expresses suicidal ideation.   Nursing note and vitals reviewed.      Labs:        Invalid input(s): [id number]      Recent Labs      04/15/17   0230  04/16/17   0213   SODIUM  137  141   POTASSIUM  4.1  3.3*   CHLORIDE  114*  111   CO2  19*  24   BUN  5*  3*   CREATININE  0.51  0.48*   CALCIUM  7.6*  8.1*     Recent Labs      04/15/17   0230  04/16/17   0213   GLUCOSE  81   148*     Recent Labs      04/15/17   0230  17   0213   RBC  3.80*  3.81*   HEMOGLOBIN  11.6*  11.3*   HEMATOCRIT  35.0*  34.5*   PLATELETCT  211  212     Recent Labs      04/15/17   0230  17   0213   WBC  12.8*  9.1   NEUTSPOLYS  72.00  55.40   LYMPHOCYTES  20.90*  31.20   MONOCYTES  5.10  6.00   EOSINOPHILS  1.30  6.40   BASOPHILS  0.50  0.80           Hemodynamics:  Temp (24hrs), Av.3 °C (97.4 °F), Min:36.1 °C (97 °F), Max:36.6 °C (97.8 °F)  Temperature: 36.3 °C (97.3 °F)  Pulse  Av.8  Min: 62  Max: 132  Blood Pressure: 119/77 mmHg     Respiratory:    Respiration: 17, Pulse Oximetry: 96 %        RUL Breath Sounds: Clear, RML Breath Sounds: Clear, RLL Breath Sounds: Clear, LINNETTE Breath Sounds: Clear, LLL Breath Sounds: Clear  Fluids:    Intake/Output Summary (Last 24 hours) at 17  Last data filed at 17 0025   Gross per 24 hour   Intake    250 ml   Output     50 ml   Net    200 ml     Weight: 54.2 kg (119 lb 7.8 oz)  GI/Nutrition:  Orders Placed This Encounter   Procedures   • Diet Order     Standing Status: Standing      Number of Occurrences: 1      Standing Expiration Date:      Order Specific Question:  Diet:     Answer:  Regular [1]     Medical Decision Making, by Problem:  Active Hospital Problems    Diagnosis   • Suicidal ideation [R45.851]  -awaiting psych recommendations  -suicide precautions and sitter at bedside  -cleared from medical standpoint for transfer to in patient psych facility   • Drug overdose [T50.901A]  unknown drug, mom thinks its etodolac NSAID  -denies abdominal pain or bleeding  -AG metabolic acidosis resolved.   • Leukocytosis [D72.829]  -likely reactive, no fever   • Anxiety [F41.9]  -ativan PRN     Patient plan of care discussed at multidisplinary team rounds and with patient and R.N at beside.    Disposition: Ok to transfer to Medical or discharge to inpatient psych facility    Labs reviewed, Medications reviewed and Radiology images  reviewed          DVT prophylaxis - mechanical: SCDs

## 2017-04-18 NOTE — PROGRESS NOTES
Bedside report received from AM RN; assumed pt care; pt in bed, awake, watching TV; pt alert and oriented x4; with sitter present for direct observation; pt denies pain or shortness of breath; plan of care discussed; pt verbalized understanding; needs met; pt denies other needs for now; call light within reach; bed in lowest position; will continue to monitor.

## 2017-04-18 NOTE — CARE PLAN
Problem: Safety  Goal: Will remain free from falls  Outcome: PROGRESSING SLOWER THAN EXPECTED  Pt teaching given regarding safety and fall precautions; pt verbalized understanding; bed in lowest position; treaded socks on; pt AAOx4; gait steady, no assistance needed; no complaints of dizziness. Sitter present.        Problem: Knowledge Deficit  Goal: Knowledge of disease process/condition, treatment plan, diagnostic tests, and medications will improve  Pt teaching given about medications, activity, unit routine, plan of care discussed.        Problem: Pain Management  Goal: Pain level will decrease to patient’s comfort goal  Pt denies any pain.

## 2017-04-18 NOTE — PROGRESS NOTES
Hospital Medicine Progress Note, Adult, Complex               Author: Shawn Marichuy        Interval History:  22 yr old with suicide attempt with drug overdose    4/15 Remains active suicidal. Leukocytosis improving. No seizures. Vitals stable. Denies CP, SOB, HA, fever or abdominal pain. Psych on board. Ok to transfer to Inpatient psych from medical standpoint.     4/16 No acute events. Denies SI. Awaiting psych recommendations. Cleared from medical standpoint. Transfer to medical.     4/17 Seen by psych, legal hold extended. Will re evaluate tomorrow. Ok to transfer from medical stand point.      Review of Systems:  Review of Systems   Constitutional: Negative for fever and chills.   Respiratory: Negative for cough.    Cardiovascular: Negative for chest pain.   Gastrointestinal: Negative for nausea, vomiting and abdominal pain.   Genitourinary: Negative for dysuria.   All other systems reviewed and are negative.      Physical Exam:  Physical Exam   Constitutional: She is oriented to person, place, and time. She appears well-nourished.   HENT:   Head: Atraumatic.   Mouth/Throat: Oropharynx is clear and moist.   Eyes: Conjunctivae are normal.   Neck: Neck supple.   Cardiovascular: Normal rate and regular rhythm.    Pulmonary/Chest: Effort normal and breath sounds normal. No respiratory distress. She has no wheezes. She has no rales.   Abdominal: Soft. Bowel sounds are normal. She exhibits no distension. There is no tenderness.   Musculoskeletal: Normal range of motion. She exhibits no edema.   Neurological: She is alert and oriented to person, place, and time.   Skin: Skin is warm and dry.   Psychiatric: She is withdrawn. She exhibits a depressed mood. She expresses no suicidal ideation.   Nursing note and vitals reviewed.      Labs:        Invalid input(s): [id number]      Recent Labs      04/15/17   0230  04/16/17   0213   SODIUM  137  141   POTASSIUM  4.1  3.3*   CHLORIDE  114*  111   CO2  19*  24   BUN  5*  3*    CREATININE  0.51  0.48*   CALCIUM  7.6*  8.1*     Recent Labs      04/15/17   0230  17   0213   GLUCOSE  81  148*     Recent Labs      04/15/17   0230  17   0213   RBC  3.80*  3.81*   HEMOGLOBIN  11.6*  11.3*   HEMATOCRIT  35.0*  34.5*   PLATELETCT  211  212     Recent Labs      04/15/17   0230  17   0213   WBC  12.8*  9.1   NEUTSPOLYS  72.00  55.40   LYMPHOCYTES  20.90*  31.20   MONOCYTES  5.10  6.00   EOSINOPHILS  1.30  6.40   BASOPHILS  0.50  0.80           Hemodynamics:  Temp (24hrs), Av.3 °C (97.4 °F), Min:36.1 °C (97 °F), Max:36.6 °C (97.8 °F)  Temperature: 36.3 °C (97.3 °F)  Pulse  Av.8  Min: 62  Max: 132  Blood Pressure: 119/77 mmHg     Respiratory:    Respiration: 17, Pulse Oximetry: 96 %        RUL Breath Sounds: Clear, RML Breath Sounds: Clear, RLL Breath Sounds: Clear, LINNETTE Breath Sounds: Clear, LLL Breath Sounds: Clear  Fluids:    Intake/Output Summary (Last 24 hours) at 17  Last data filed at 17 0025   Gross per 24 hour   Intake    250 ml   Output     50 ml   Net    200 ml     Weight: 54.2 kg (119 lb 7.8 oz)  GI/Nutrition:  Orders Placed This Encounter   Procedures   • Diet Order     Standing Status: Standing      Number of Occurrences: 1      Standing Expiration Date:      Order Specific Question:  Diet:     Answer:  Regular [1]     Medical Decision Making, by Problem:  Active Hospital Problems    Diagnosis   • Suicidal ideation [R45.851]  -legal hold extended  -suicide precautions and sitter at bedside  -cleared from medical standpoint for transfer to in patient psych facility vs discharge    • Drug overdose [T50.901A]  -unknown drug, mom thinks its etodolac NSAID  -denies abdominal pain or bleeding  -AG metabolic acidosis resolved  -no recurrent seizures   • Leukocytosis [D72.829]  -likely reactive, no fever  -resolved    • Anxiety [F41.9]  -ativan PRN     Patient plan of care discussed at multidisplinary team rounds and with patient and R.N at  beside.    Disposition: Ok to transfer to Medical, discharge to inpatient psych facility vs discharge home depending on psychiatry recommendations    Labs reviewed, Medications reviewed and Radiology images reviewed          DVT prophylaxis - mechanical: SCDs

## 2017-04-18 NOTE — DISCHARGE PLANNING
Received Legal hold and extension from [facility]. Notified [facility] that the medical clearance is incomplete.

## 2017-04-19 VITALS
HEIGHT: 63 IN | RESPIRATION RATE: 16 BRPM | BODY MASS INDEX: 20.74 KG/M2 | SYSTOLIC BLOOD PRESSURE: 105 MMHG | HEART RATE: 55 BPM | OXYGEN SATURATION: 93 % | DIASTOLIC BLOOD PRESSURE: 60 MMHG | WEIGHT: 117.06 LBS | TEMPERATURE: 97.9 F

## 2017-04-19 PROCEDURE — 99239 HOSP IP/OBS DSCHRG MGMT >30: CPT | Performed by: INTERNAL MEDICINE

## 2017-04-19 ASSESSMENT — PAIN SCALES - GENERAL
PAINLEVEL_OUTOF10: 0

## 2017-04-19 NOTE — DISCHARGE INSTRUCTIONS
Discharge Instructions    Discharged to home by car with relative. Discharged via walking, hospital escort: Refused.  Special equipment needed: Not Applicable    Be sure to schedule a follow-up appointment with your primary care doctor or any specialists as instructed.     Discharge Plan:   Diet Plan: Discussed  Activity Level: Discussed  Confirmed Follow up Appointment: Patient to Call and Schedule Appointment  Confirmed Symptoms Management: Discussed  Medication Reconciliation Updated: Yes  Influenza Vaccine Indication: Patient Refuses    I understand that a diet low in cholesterol, fat, and sodium is recommended for good health. Unless I have been given specific instructions below for another diet, I accept this instruction as my diet prescription.   Other diet: Regular    Special Instructions: None    · Is patient discharged on Warfarin / Coumadin?   No     · Is patient Post Blood Transfusion?  No    Depression / Suicide Risk    As you are discharged from this [facility] facility, it is important to learn how to keep safe from harming yourself.    Recognize the warning signs:  · Abrupt changes in personality, positive or negative- including increase in energy   · Giving away possessions  · Change in eating patterns- significant weight changes-  positive or negative  · Change in sleeping patterns- unable to sleep or sleeping all the time   · Unwillingness or inability to communicate  · Depression  · Unusual sadness, discouragement and loneliness  · Talk of wanting to die  · Neglect of personal appearance   · Rebelliousness- reckless behavior  · Withdrawal from people/activities they love  · Confusion- inability to concentrate     If you or a loved one observes any of these behaviors or has concerns about self-harm, here's what you can do:  · Talk about it- your feelings and reasons for harming yourself  · Remove any means that you might use to hurt yourself (examples: pills, rope, extension cords,  "firearm)  · Get professional help from the community (Mental Health, Substance Abuse, psychological counseling)  · Do not be alone:Call your Safe Contact- someone whom you trust who will be there for you.  · Call your local CRISIS HOTLINE 804-2090 or 955-386-3118  · Call your local Children's Mobile Crisis Response Team [place] (682) 007-5522 or www.Poke'n Call  · Call the toll free National Suicide Prevention Hotlines   · National Suicide Prevention Lifeline 198-021-ZIVT (7237)  · [employer] Line Network 800-SUICIDE (809-2236)      Overdose, Adult  A person can overdose on alcohol, drugs or both by accident or on purpose. If it was on purpose, it is a serious matter. Professional help should be sought. If the overdose was an accident, certain steps should be taken to make sure that it never happens again.  ACCIDENTAL OVERDOSE  Overdosing on prescription medications can be a result of:  · Not understanding the instructions.  · Misreading the label.  · Forgetting that you took a dose and then taking another by mistake. This situation happens a lot.  To make sure this does not happen again:  · Clarify the correct dosage with your caregiver.  · Place the correct dosage in a \"pill-minder\" container (labeled for each day and time of day).  · Have someone dispense your medicine.  Please be sure to follow-up with your primary care doctor as directed.  INTENTIONAL OVERDOSE  If the overdose was on purpose, it is a serious situation. Taking more than the prescribed amount of medications (including taking someone else's prescription), abusing street drugs or drinking an amount of alcohol that requires medical treatment can show a variety of possible problems. These may indicate you:  · Are depressed or suicidal.  · Are abusing drugs, took too much or combined different drugs to experiment with the effects.  · Mixed alcohol with drugs and did not realize the danger of doing so (this is drug abuse).  · Are " "suffering addiction to drugs and/or alcohol (also known as chemical dependency).  · Binge drink.  If you have not been referred to a mental health professional for help, it is important that you get help right away. Only a professional can determine which problems may exist and what the best course of treatment may be. It is your responsibility to follow-up with further evaluation or treatment as directed.   Alcohol is responsible for a large number of overdoses and unintended deaths among college-age young adults. Binge drinking is consuming 4-5 drinks in a short period of time. The amount of alcohol in standard servings of wine (5 oz.), beer (12 oz.) and distilled spirits (1.5 oz., 80 proof) is the same. Beer or wine can be just as dangerous to the binge drinker as \"hard\" liquor can be.   CONSEQUENCES OF BINGE DRINKING  Alcohol poisoning is the most serious consequence of binge drinking. This is a severe and potentially fatal physical reaction to an alcohol overdose. When too much alcohol is consumed, the brain does not get enough oxygen. The lack of oxygen will eventually cause the brain to shut down the voluntary functions that regulate breathing and heart rate. Symptoms of alcohol poisoning include:  · Vomiting.  · Passing out (unconsciousness).  · Cold, clammy, pale or bluish skin.  · Slow or irregular breathing.  WHAT SHOULD I DO NEXT?  If you have a history of drug abuse or suffer chemical dependency (alcoholism, drug addiction or both), you might consider the following:  · Talk with a qualified substance abuse counselor and consider entering a treatment program.  · Go to a detox facility if necessary.  · If you were attending self-help group meetings, consider returning to them and go often.  · Explore other resources located near you (see sources listed below).  If you are unsure if you have a substance abuse problem, ask yourself the following questions:  · Have you been told by friends or family that " "drugs/alcohol has become a problem?  · Do you get into fights when drinking or using drugs?  · Do you have blackouts (not remembering what you do while using)?  · Do you lie about use or amounts of drugs or alcohol you consume?  · Do you need chemicals to get you going?  · Do you suffer in work or school performance because of drug or alcohol use?  · Do you get sick from drug or alcohol use but continue to use anyway?  · Do you need drugs or alcohol to relate to people or feel comfortable in social situations?  · Do you use drugs or alcohol to forget problems?  If you answered \"Yes\" to any of the above questions, it means you show signs of chemical dependency and a professional evaluation is suggested. The longer the use of drugs and alcohol continues, the problems will become greater.  SEEK IMMEDIATE MEDICAL CARE IF:   · You feel like you might repeat your problematic behavior.  · You need someone to talk to and feel that it should not wait.  · You feel you are a danger to yourself or someone else.  · You feel like you are having a new reaction to medications you are taking, or you are getting worse after leaving a care center.  · You have an overwhelming urge to drink or use drugs.  Addiction cannot be cured, but it can be treated successfully. Treatment centers are listed in the yellow pages under: Cocaine, Narcotics, and Alcoholics Anonymous. Most hospitals and clinics can refer you to a specialized care center. The  government maintains a toll-free number for obtaining treatment referrals: 1-816.903.9883 or 1-501.191.7603 (TDD) and maintains a website: http://findtreatment.samhsa.gov. Other websites for additional information are: www.mentalhealth.samhsa.gov. and www.[place].gov.  [place] treatment resources are listed in each Province with listings available under [facility] or similar titles.  Document Released: 12/20/2004 Document Revised: 03/11/2013 Document Reviewed: " 11/11/2009  ExitCare® Patient Information ©2014 [employer].

## 2017-04-19 NOTE — PSYCHIATRY
"PSYCHIATRIC FOLLOW UP:    Reason for Admission: Drug overdose, Suicidal ideation.   Legal hold status: +  , now dc'd.    Pt says that she and her BF of 4 years broke up about 6 days ago then he showed up with his new GF. She became very distraught though she can verbalize why she thinks that this was actually a good thing in the end, and overdosed. She admits she misses him but also reports he was very controlling etc. \"It was stupid\".    She states that she is trying to get her GED and is attending classes when not working. 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 Her family \"cares\" and she will go back and live with mom. They get along relatively well. She feels her energy is back. Would like to have a work excuse as well. Pt would like to have therapy referrals as well.    With permission allowed me to speak with her mom Leeanne [phone] who felt pt was safe to return home.    Pt's voice was noted to tremble so to speak and she says that this is from childhood and she had to have speech classes but it still does this.    Psychiatric Examination: observed phenomenon:  Vitals:Blood pressure 105/60, pulse 55, temperature 36.6 °C (97.9 °F), resp. rate 16, height 1.6 m (5' 3\"), weight 53.1 kg (117 lb 1 oz), SpO2 93 %.  Musculoskeletal(abnormal movements, gait, etc):none  Appearance: clean, good eye contact, cooperative.  Thoughts:linear, no psychosis  Speech: voice tends to tremble   Mood: sad when thinking of ex but brightens when focused on future.    Affect: appropriate  SI/HI: denies  Attention/Alertness:intact     Memory:    intact  Orientation: x 4  Fund of Knowledge:good      Insight/Judgement into psychiatric symptoms: good     Assessment:(acuity level)  Adjustment Disorder with depressed mood: improved, stable    Plan: referrals. No scripts.  legal hold:dc     Phone Calls:to mom who can pick her up after 3 pm   Signing off            "

## 2017-04-19 NOTE — CARE PLAN
Problem: Safety  Goal: Will remain free from injury  Outcome: PROGRESSING AS EXPECTED  No injuries this shift. Pt is legal hold, 1:1 sitter in place.    Problem: Psychosocial Needs:  Goal: Level of anxiety will decrease  Outcome: PROGRESSING AS EXPECTED  Pt anxious and tearful at beginning of shift regarding discharge. She states that she is ready to go home and wants the process to go quickly so that she can return home, to school, and to work. Emotional needs expressed, this RN provided therapeutic communication.

## 2017-04-19 NOTE — PROGRESS NOTES
Assumed care of pt, legal hold with 1:1 sitter.  Assessment complete.  A&O x 4.  No signs of distress.  Pt denies any pain.  Discussed plan of care.  Call light within reach.  Bed alarm off, pt mobilizes independently.  Treaded socks on pt.  Will continue to monitor.

## 2017-04-19 NOTE — PROGRESS NOTES
Hospital Medicine Progress Note, Adult, Complex               Author: Catie Scott Date & Time created: 4/18/2017  7:51 PM     Interval History:  23 YO female w/o PMH took suicidal OD after split w/ boyfriend. Patient remains on legal hold, denies prior SA, mother states patient has had prior SI. Patient lives w/ mother. 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 Remains on legal hold    Review of Systems:  Review of Systems   Respiratory: Negative for shortness of breath.    Cardiovascular: Negative for chest pain.   Gastrointestinal: Negative for nausea, vomiting and abdominal pain.   Psychiatric/Behavioral: Negative for suicidal ideas.       Physical Exam:  Physical Exam   Constitutional: She is oriented to person, place, and time. She appears well-developed and well-nourished. No distress.   HENT:   Head: Normocephalic and atraumatic.   Eyes: EOM are normal. Pupils are equal, round, and reactive to light. Right eye exhibits no discharge. Left eye exhibits no discharge.   Neck: Neck supple.   Cardiovascular: Normal rate and regular rhythm.    Pulmonary/Chest: Effort normal and breath sounds normal.   Abdominal: Soft. Bowel sounds are normal. She exhibits no distension.   Musculoskeletal: She exhibits no edema or tenderness.   Neurological: She is alert and oriented to person, place, and time.   Skin: Skin is warm and dry. She is not diaphoretic.   Psychiatric: She has a normal mood and affect.   Nursing note and vitals reviewed.      Labs:        Invalid input(s): [id number]      Recent Labs      04/16/17 0213  04/18/17   0341   SODIUM  141  143   POTASSIUM  3.3*  3.3*   CHLORIDE  111  110   CO2  24  25   BUN  3*  5*   CREATININE  0.48*  0.51   CALCIUM  8.1*  8.4*     Recent Labs      04/16/17   0213  04/18/17   0341   GLUCOSE  148*  95     Recent Labs      04/16/17 0213  04/18/17   0341   RBC  3.81*  4.10*   HEMOGLOBIN  11.3*  12.0   HEMATOCRIT  34.5*  37.3   PLATELETCT  212  231      Recent Labs      17   0213  17   0341   WBC  9.1  8.3   NEUTSPOLYS  55.40  64.40   LYMPHOCYTES  31.20  25.20   MONOCYTES  6.00  6.10   EOSINOPHILS  6.40  4.30   BASOPHILS  0.80  0.00           Hemodynamics:  Temp (24hrs), Av.4 °C (97.5 °F), Min:36.1 °C (96.9 °F), Max:36.9 °C (98.4 °F)  Temperature: 36.9 °C (98.4 °F)  Pulse  Av.2  Min: 57  Max: 132  Blood Pressure: 107/60 mmHg     Respiratory:    Respiration: 14, Pulse Oximetry: 96 %        RUL Breath Sounds: Clear, RML Breath Sounds: Clear, RLL Breath Sounds: Clear, LINNETTE Breath Sounds: Clear, LLL Breath Sounds: Clear  Fluids:    Intake/Output Summary (Last 24 hours) at 17 195  Last data filed at 17 2100   Gross per 24 hour   Intake    240 ml   Output      0 ml   Net    240 ml     Weight: 54.2 kg (119 lb 7.8 oz)  GI/Nutrition:  Orders Placed This Encounter   Procedures   • Diet Order     Standing Status: Standing      Number of Occurrences: 1      Standing Expiration Date:      Order Specific Question:  Diet:     Answer:  Regular [1]     Medical Decision Making, by Problem:  Active Hospital Problems    Diagnosis   • Suicidal ideation [R45.851] legal hold, medically clear, court tomorrow   • Drug overdose [T50.901A]   • Leukocytosis [D72.829] resolved   • Anxiety [F41.9] sleeps most of time       Labs reviewed and Medications reviewed  Cervantes catheter: No Cervantes        DVT prophylaxis - mechanical: SCDs  Ulcer prophylaxis: Not indicated    Assessed for rehab: Patient returned to prior level of function, rehabilitation not indicated at this time

## 2017-04-19 NOTE — CARE PLAN
Problem: Venous Thromboembolism (VTW)/Deep Vein Thrombosis (DVT) Prevention:  Goal: Patient will participate in Venous Thrombosis (VTE)/Deep Vein Thrombosis (DVT)Prevention Measures  SCD's for DVT prophylaxis.    Problem: Discharge Barriers/Planning  Goal: Patient’s continuum of care needs will be met  Legal hold DC'ed today.  Pt to discharge home with mom.

## 2017-04-19 NOTE — DISCHARGE PLANNING
Medical Social Work    SW received order from Dr. Rodriguez indicating pt is being discontinued from legal hold. SW completed work note for pt indicating she is able to resume her normal activities. Pt will d/c home with her family and will be picked up by her mother today after 1500.    Plan: Pt will d/c home with her family today.

## 2017-04-20 ENCOUNTER — PATIENT OUTREACH (OUTPATIENT)
Dept: HEALTH INFORMATION MANAGEMENT | Facility: OTHER | Age: 22
End: 2017-04-20

## 2017-04-25 PROBLEM — D72.829 LEUKOCYTOSIS: Status: RESOLVED | Noted: 2017-04-15 | Resolved: 2017-04-25

## 2018-02-02 LAB — HBA1C MFR BLD: 5.6 % (ref ?–5.8)

## 2018-06-30 ENCOUNTER — HOSPITAL ENCOUNTER (INPATIENT)
Dept: HOSPITAL 8 - LDOP | Age: 23
LOS: 4 days | Discharge: HOME | End: 2018-07-04
Attending: SPECIALIST | Admitting: SPECIALIST
Payer: COMMERCIAL

## 2018-06-30 VITALS — SYSTOLIC BLOOD PRESSURE: 124 MMHG | DIASTOLIC BLOOD PRESSURE: 69 MMHG

## 2018-06-30 VITALS — WEIGHT: 150.31 LBS | HEIGHT: 63 IN | BODY MASS INDEX: 26.63 KG/M2

## 2018-06-30 VITALS — DIASTOLIC BLOOD PRESSURE: 69 MMHG | SYSTOLIC BLOOD PRESSURE: 124 MMHG

## 2018-06-30 DIAGNOSIS — Z3A.39: ICD-10-CM

## 2018-06-30 LAB
BASOPHILS # BLD AUTO: 0.01 X10^3/UL (ref 0–0.1)
BASOPHILS NFR BLD AUTO: 0 % (ref 0–1)
EOSINOPHIL # BLD AUTO: 0.01 X10^3/UL (ref 0–0.4)
EOSINOPHIL NFR BLD AUTO: 0 % (ref 1–7)
ERYTHROCYTE [DISTWIDTH] IN BLOOD BY AUTOMATED COUNT: 13.8 % (ref 9.6–15.2)
LYMPHOCYTES # BLD AUTO: 0.67 X10^3/UL (ref 1–3.4)
LYMPHOCYTES NFR BLD AUTO: 4 % (ref 22–44)
MCH RBC QN AUTO: 32.7 PG (ref 27–34.8)
MCHC RBC AUTO-ENTMCNC: 33.7 G/DL (ref 32.4–35.8)
MCV RBC AUTO: 96.9 FL (ref 80–100)
MD: NO
MONOCYTES # BLD AUTO: 0.36 X10^3/UL (ref 0.2–0.8)
MONOCYTES NFR BLD AUTO: 2 % (ref 2–9)
NEUTROPHILS # BLD AUTO: 16.17 X10^3/UL (ref 1.8–6.8)
NEUTROPHILS NFR BLD AUTO: 94 % (ref 42–75)
PLATELET # BLD AUTO: 165 X10^3/UL (ref 130–400)
PMV BLD AUTO: 9.2 FL (ref 7.4–10.4)
RBC # BLD AUTO: 3.99 X10^6/UL (ref 3.82–5.3)

## 2018-06-30 PROCEDURE — 82803 BLOOD GASES ANY COMBINATION: CPT

## 2018-06-30 PROCEDURE — 85730 THROMBOPLASTIN TIME PARTIAL: CPT

## 2018-06-30 PROCEDURE — 90715 TDAP VACCINE 7 YRS/> IM: CPT

## 2018-06-30 PROCEDURE — 85610 PROTHROMBIN TIME: CPT

## 2018-06-30 PROCEDURE — 85025 COMPLETE CBC W/AUTO DIFF WBC: CPT

## 2018-06-30 PROCEDURE — 36415 COLL VENOUS BLD VENIPUNCTURE: CPT

## 2018-06-30 PROCEDURE — 86850 RBC ANTIBODY SCREEN: CPT

## 2018-06-30 PROCEDURE — 80307 DRUG TEST PRSMV CHEM ANLYZR: CPT

## 2018-06-30 PROCEDURE — 86900 BLOOD TYPING SEROLOGIC ABO: CPT

## 2018-06-30 RX ADMIN — SODIUM CHLORIDE, SODIUM LACTATE, POTASSIUM CHLORIDE, AND CALCIUM CHLORIDE SCH MLS/HR: .6; .31; .03; .02 INJECTION, SOLUTION INTRAVENOUS at 14:39

## 2018-06-30 RX ADMIN — CALCIUM CARBONATE (ANTACID) CHEW TAB 500 MG PRN MG: 500 CHEW TAB at 20:31

## 2018-07-01 VITALS — DIASTOLIC BLOOD PRESSURE: 84 MMHG | SYSTOLIC BLOOD PRESSURE: 124 MMHG

## 2018-07-01 VITALS — DIASTOLIC BLOOD PRESSURE: 84 MMHG | SYSTOLIC BLOOD PRESSURE: 126 MMHG

## 2018-07-01 VITALS — SYSTOLIC BLOOD PRESSURE: 124 MMHG | DIASTOLIC BLOOD PRESSURE: 86 MMHG

## 2018-07-01 VITALS — SYSTOLIC BLOOD PRESSURE: 121 MMHG | DIASTOLIC BLOOD PRESSURE: 81 MMHG

## 2018-07-01 LAB
<PLATELET ESTIMATE>: (no result)
<PLATELET ESTIMATE>: ADEQUATE
<PLT MORPHOLOGY>: (no result)
<PLT MORPHOLOGY>: (no result)
APTT BLD: 36 SECONDS (ref 25–31)
BAND#(MANUAL): 2.36 X10^3/UL
BAND#(MANUAL): 3.19 X10^3/UL
BILIRUB DIRECT SERPL-MCNC: NORMAL MG/DL
BILIRUB DIRECT SERPL-MCNC: NORMAL MG/DL
ERYTHROCYTE [DISTWIDTH] IN BLOOD BY AUTOMATED COUNT: 13.7 % (ref 9.6–15.2)
ERYTHROCYTE [DISTWIDTH] IN BLOOD BY AUTOMATED COUNT: 13.9 % (ref 9.6–15.2)
INR PPP: 1.03 (ref 0.93–1.1)
LYMPH#(MANUAL): 0.8 X10^3/UL (ref 1–3.4)
LYMPH#(MANUAL): 1.39 X10^3/UL (ref 1–3.4)
LYMPHS% (MANUAL): 10 % (ref 22–44)
LYMPHS% (MANUAL): 6 % (ref 22–44)
MCH RBC QN AUTO: 32.4 PG (ref 27–34.8)
MCH RBC QN AUTO: 32.5 PG (ref 27–34.8)
MCHC RBC AUTO-ENTMCNC: 33.4 G/DL (ref 32.4–35.8)
MCHC RBC AUTO-ENTMCNC: 33.7 G/DL (ref 32.4–35.8)
MCV RBC AUTO: 96.4 FL (ref 80–100)
MCV RBC AUTO: 96.9 FL (ref 80–100)
MD: YES
MD: YES
METAMYELOCYTES# (MANUAL): 0.28 X10^3/UL (ref 0–0)
METAMYELOCYTES% (MANUAL): 2 % (ref 0–1)
MONOS#(MANUAL): 0.28 X10^3/UL (ref 0.3–2.7)
MONOS#(MANUAL): 0.53 X10^3/UL (ref 0.3–2.7)
MONOS% (MANUAL): 2 % (ref 2–9)
MONOS% (MANUAL): 4 % (ref 2–9)
MYELOCYTES# (MANUAL): 0.14 X10^3/UL (ref 0–0)
MYELOCYTES% (MANUAL): 1 % (ref 0–0)
NEUTS BAND NFR BLD: 17 % (ref 0–7)
NEUTS BAND NFR BLD: 24 % (ref 0–7)
NRBC % (MANUAL): 1 % (ref 0–1)
PLATELET # BLD AUTO: 126 X10^3/UL (ref 130–400)
PLATELET # BLD AUTO: 134 X10^3/UL (ref 130–400)
PMV BLD AUTO: 8.8 FL (ref 7.4–10.4)
PMV BLD AUTO: 8.9 FL (ref 7.4–10.4)
PROTHROMBIN TIME: 10.7 SECONDS (ref 9.6–11.5)
RBC # BLD AUTO: 3.38 X10^6/UL (ref 3.82–5.3)
RBC # BLD AUTO: 3.48 X10^6/UL (ref 3.82–5.3)
REACTIVE LYMPHS # (MANUAL): 0.13 X10^3/UL (ref 0–0)
REACTIVE LYMPHS % (MANUAL): 1 % (ref 0–0)
SEG#(MANUAL): 8.65 X10^3/UL (ref 1.8–6.8)
SEG#(MANUAL): 9.45 X10^3/UL (ref 1.8–6.8)
SEGS% (MANUAL): 65 % (ref 42–75)
SEGS% (MANUAL): 68 % (ref 42–75)

## 2018-07-01 PROCEDURE — 10H07YZ INSERTION OF OTHER DEVICE INTO PRODUCTS OF CONCEPTION, VIA NATURAL OR ARTIFICIAL OPENING: ICD-10-PCS | Performed by: SPECIALIST

## 2018-07-01 RX ADMIN — KETOROLAC TROMETHAMINE SCH MG: 30 INJECTION, SOLUTION INTRAMUSCULAR at 16:29

## 2018-07-01 RX ADMIN — SODIUM CHLORIDE, SODIUM LACTATE, POTASSIUM CHLORIDE, AND CALCIUM CHLORIDE SCH MLS/HR: .6; .31; .03; .02 INJECTION, SOLUTION INTRAVENOUS at 02:09

## 2018-07-01 RX ADMIN — SODIUM CHLORIDE, SODIUM LACTATE, POTASSIUM CHLORIDE, AND CALCIUM CHLORIDE SCH MLS/HR: .6; .31; .03; .02 INJECTION, SOLUTION INTRAVENOUS at 08:27

## 2018-07-01 RX ADMIN — Medication SCH MLS/HR: at 17:33

## 2018-07-01 RX ADMIN — MORPHINE SULFATE PRN MG: 4 INJECTION INTRAVENOUS at 08:15

## 2018-07-01 RX ADMIN — KETOROLAC TROMETHAMINE SCH MG: 30 INJECTION, SOLUTION INTRAMUSCULAR at 22:08

## 2018-07-01 RX ADMIN — SODIUM CHLORIDE, SODIUM LACTATE, POTASSIUM CHLORIDE, AND CALCIUM CHLORIDE SCH MLS/HR: .6; .31; .03; .02 INJECTION, SOLUTION INTRAVENOUS at 17:33

## 2018-07-01 RX ADMIN — PRENATAL VIT W/ FE FUMARATE-FA TAB 27-0.8 MG SCH EACH: 27-0.8 TAB at 09:00

## 2018-07-01 RX ADMIN — OXYCODONE HYDROCHLORIDE PRN MG: 5 TABLET ORAL at 17:29

## 2018-07-01 RX ADMIN — Medication SCH MLS/HR: at 08:28

## 2018-07-01 RX ADMIN — SODIUM CHLORIDE, SODIUM LACTATE, POTASSIUM CHLORIDE, AND CALCIUM CHLORIDE SCH MLS/HR: .6; .31; .03; .02 INJECTION, SOLUTION INTRAVENOUS at 05:45

## 2018-07-01 RX ADMIN — MORPHINE SULFATE PRN MG: 4 INJECTION INTRAVENOUS at 09:19

## 2018-07-01 RX ADMIN — MORPHINE SULFATE PRN MG: 4 INJECTION INTRAVENOUS at 09:03

## 2018-07-01 RX ADMIN — OXYCODONE HYDROCHLORIDE PRN MG: 5 TABLET ORAL at 13:20

## 2018-07-01 RX ADMIN — CALCIUM CARBONATE (ANTACID) CHEW TAB 500 MG PRN MG: 500 CHEW TAB at 04:16

## 2018-07-01 RX ADMIN — SODIUM CHLORIDE, SODIUM LACTATE, POTASSIUM CHLORIDE, AND CALCIUM CHLORIDE SCH MLS/HR: .6; .31; .03; .02 INJECTION, SOLUTION INTRAVENOUS at 23:05

## 2018-07-01 RX ADMIN — SODIUM CHLORIDE, SODIUM LACTATE, POTASSIUM CHLORIDE, AND CALCIUM CHLORIDE SCH MLS/HR: .6; .31; .03; .02 INJECTION, SOLUTION INTRAVENOUS at 15:27

## 2018-07-01 RX ADMIN — SODIUM CHLORIDE, SODIUM LACTATE, POTASSIUM CHLORIDE, AND CALCIUM CHLORIDE SCH MLS/HR: .6; .31; .03; .02 INJECTION, SOLUTION INTRAVENOUS at 07:05

## 2018-07-02 VITALS — DIASTOLIC BLOOD PRESSURE: 80 MMHG | SYSTOLIC BLOOD PRESSURE: 118 MMHG

## 2018-07-02 VITALS — SYSTOLIC BLOOD PRESSURE: 107 MMHG | DIASTOLIC BLOOD PRESSURE: 69 MMHG

## 2018-07-02 VITALS — DIASTOLIC BLOOD PRESSURE: 78 MMHG | SYSTOLIC BLOOD PRESSURE: 115 MMHG

## 2018-07-02 RX ADMIN — MEASLES, MUMPS, AND RUBELLA VIRUS VACCINE LIVE PRN ML: 1000; 12500; 1000 INJECTION, POWDER, LYOPHILIZED, FOR SUSPENSION SUBCUTANEOUS at 22:46

## 2018-07-02 RX ADMIN — KETOROLAC TROMETHAMINE SCH MG: 30 INJECTION, SOLUTION INTRAMUSCULAR at 10:18

## 2018-07-02 RX ADMIN — KETOROLAC TROMETHAMINE SCH MG: 30 INJECTION, SOLUTION INTRAMUSCULAR at 22:45

## 2018-07-02 RX ADMIN — MEASLES, MUMPS, AND RUBELLA VIRUS VACCINE LIVE PRN ML: 1000; 12500; 1000 INJECTION, POWDER, LYOPHILIZED, FOR SUSPENSION SUBCUTANEOUS at 22:54

## 2018-07-02 RX ADMIN — Medication SCH MLS/HR: at 11:55

## 2018-07-02 RX ADMIN — SODIUM CHLORIDE, SODIUM LACTATE, POTASSIUM CHLORIDE, AND CALCIUM CHLORIDE SCH MLS/HR: .6; .31; .03; .02 INJECTION, SOLUTION INTRAVENOUS at 23:05

## 2018-07-02 RX ADMIN — Medication SCH MLS/HR: at 03:05

## 2018-07-02 RX ADMIN — DOCUSATE SODIUM PRN MG: 100 CAPSULE, LIQUID FILLED ORAL at 22:47

## 2018-07-02 RX ADMIN — KETOROLAC TROMETHAMINE SCH MG: 30 INJECTION, SOLUTION INTRAMUSCULAR at 16:17

## 2018-07-02 RX ADMIN — SODIUM CHLORIDE, SODIUM LACTATE, POTASSIUM CHLORIDE, AND CALCIUM CHLORIDE SCH MLS/HR: .6; .31; .03; .02 INJECTION, SOLUTION INTRAVENOUS at 07:39

## 2018-07-02 RX ADMIN — PRENATAL VIT W/ FE FUMARATE-FA TAB 27-0.8 MG SCH EACH: 27-0.8 TAB at 08:14

## 2018-07-02 RX ADMIN — KETOROLAC TROMETHAMINE SCH MG: 30 INJECTION, SOLUTION INTRAMUSCULAR at 04:50

## 2018-07-02 RX ADMIN — SODIUM CHLORIDE, SODIUM LACTATE, POTASSIUM CHLORIDE, AND CALCIUM CHLORIDE SCH MLS/HR: .6; .31; .03; .02 INJECTION, SOLUTION INTRAVENOUS at 11:55

## 2018-07-02 RX ADMIN — Medication SCH MLS/HR: at 23:05

## 2018-07-02 RX ADMIN — SODIUM CHLORIDE, SODIUM LACTATE, POTASSIUM CHLORIDE, AND CALCIUM CHLORIDE SCH MLS/HR: .6; .31; .03; .02 INJECTION, SOLUTION INTRAVENOUS at 03:05

## 2018-07-02 RX ADMIN — DOCUSATE SODIUM PRN MG: 100 CAPSULE, LIQUID FILLED ORAL at 08:14

## 2018-07-03 VITALS — DIASTOLIC BLOOD PRESSURE: 78 MMHG | SYSTOLIC BLOOD PRESSURE: 117 MMHG

## 2018-07-03 VITALS — SYSTOLIC BLOOD PRESSURE: 132 MMHG | DIASTOLIC BLOOD PRESSURE: 77 MMHG

## 2018-07-03 RX ADMIN — KETOROLAC TROMETHAMINE SCH MG: 30 INJECTION, SOLUTION INTRAMUSCULAR at 10:30

## 2018-07-03 RX ADMIN — SODIUM CHLORIDE, SODIUM LACTATE, POTASSIUM CHLORIDE, AND CALCIUM CHLORIDE SCH MLS/HR: .6; .31; .03; .02 INJECTION, SOLUTION INTRAVENOUS at 07:05

## 2018-07-03 RX ADMIN — SODIUM CHLORIDE, SODIUM LACTATE, POTASSIUM CHLORIDE, AND CALCIUM CHLORIDE SCH MLS/HR: .6; .31; .03; .02 INJECTION, SOLUTION INTRAVENOUS at 09:05

## 2018-07-03 RX ADMIN — KETOROLAC TROMETHAMINE SCH MG: 30 INJECTION, SOLUTION INTRAMUSCULAR at 04:53

## 2018-07-03 RX ADMIN — Medication SCH MLS/HR: at 09:05

## 2018-07-03 RX ADMIN — DOCUSATE SODIUM PRN MG: 100 CAPSULE, LIQUID FILLED ORAL at 08:23

## 2018-07-03 RX ADMIN — IBUPROFEN PRN MG: 600 TABLET ORAL at 16:57

## 2018-07-03 RX ADMIN — PRENATAL VIT W/ FE FUMARATE-FA TAB 27-0.8 MG SCH EACH: 27-0.8 TAB at 08:22

## 2018-07-04 VITALS — DIASTOLIC BLOOD PRESSURE: 75 MMHG | SYSTOLIC BLOOD PRESSURE: 118 MMHG

## 2018-07-04 RX ADMIN — IBUPROFEN PRN MG: 600 TABLET ORAL at 12:40

## 2018-07-04 RX ADMIN — PRENATAL VIT W/ FE FUMARATE-FA TAB 27-0.8 MG SCH EACH: 27-0.8 TAB at 09:00

## 2018-07-04 RX ADMIN — IBUPROFEN PRN MG: 600 TABLET ORAL at 01:55

## 2019-06-10 ENCOUNTER — HOSPITAL ENCOUNTER (EMERGENCY)
Dept: HOSPITAL 8 - ED | Age: 24
Discharge: HOME | End: 2019-06-10
Payer: COMMERCIAL

## 2019-06-10 VITALS — BODY MASS INDEX: 22.5 KG/M2 | WEIGHT: 126.99 LBS | HEIGHT: 63 IN

## 2019-06-10 VITALS — SYSTOLIC BLOOD PRESSURE: 123 MMHG | DIASTOLIC BLOOD PRESSURE: 75 MMHG
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
## 2019-06-10 DIAGNOSIS — S29.012A: Primary | ICD-10-CM
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
## 2019-06-10 DIAGNOSIS — M25.562: ICD-10-CM
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
## 2019-06-10 DIAGNOSIS — S16.1XXA: ICD-10-CM

## 2019-06-10 DIAGNOSIS — R51: ICD-10-CM

## 2019-06-10 PROCEDURE — 72072 X-RAY EXAM THORAC SPINE 3VWS: CPT

## 2019-06-10 PROCEDURE — 96372 THER/PROPH/DIAG INJ SC/IM: CPT

## 2019-06-10 PROCEDURE — 70486 CT MAXILLOFACIAL W/O DYE: CPT

## 2019-06-10 PROCEDURE — 99284 EMERGENCY DEPT VISIT MOD MDM: CPT

## 2019-06-10 PROCEDURE — 72125 CT NECK SPINE W/O DYE: CPT

## 2019-06-24 NOTE — DISCHARGE SUMMARY
CHIEF COMPLAINT ON ADMISSION  Chief Complaint   Patient presents with   • Drug Overdose   • Depression   • Suicidal Ideation       CODE STATUS  Prior    HPI & HOSPITAL COURSE  21 YO female w/o PMH took suicidal OD after split w/ boyfriend. Patient remains on legal hold, denies prior SA, mother states patient has had prior SI. Patient lives w/ mother. 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 She was followed by psychiatry and was on legal hold. Psychiatry discontinued the legal hold and released her to the custody of her mother.    Therefore, she is discharged in good and stable condition with close outpatient follow-up.    SPECIFIC OUTPATIENT FOLLOW-UP  Primary Care    DISCHARGE PROBLEM LIST  Active Problems:    Suicidal ideation POA: Yes    Drug overdose POA: Yes    Leukocytosis POA: Yes    Anxiety POA: Yes  Resolved Problems:    * No resolved hospital problems. *      FOLLOW UP  Future Appointments  Date Time Provider Department Center   4/20/2017 1:00 PM CARE MANAGER [facility]   4/26/2017 3:40 PM BUNNY Sommer [facility]   5/31/2017 3:00 PM Jerson Aguilar M.D. [facility]     Pcp Pt States None            MEDICATIONS ON DISCHARGE  There are no discharge medications for this patient.       DIET  regular    ACTIVITY  Regular  May return to work and school on 4/20. Notes for each were provided.    CONSULTATIONS  Psychiatry    PROCEDURES  none    LABORATORY  Lab Results   Component Value Date/Time    SODIUM 143 04/18/2017 03:41 AM    POTASSIUM 3.3* 04/18/2017 03:41 AM    CHLORIDE 110 04/18/2017 03:41 AM    CO2 25 04/18/2017 03:41 AM    GLUCOSE 95 04/18/2017 03:41 AM    BUN 5* 04/18/2017 03:41 AM    CREATININE 0.51 04/18/2017 03:41 AM        Lab Results   Component Value Date/Time    WBC 8.3 04/18/2017 03:41 AM    HEMOGLOBIN 12.0 04/18/2017 03:41 AM    HEMATOCRIT 37.3 04/18/2017 03:41 AM    PLATELET COUNT 231 04/18/2017 03:41 AM        Total time of the discharge process exceeds 35  Last OV: 6/5/18 with Jing Card for annual  Last refill date: 3/27/19  Follow-up: 1 year  Next appt. : 7/12/19 with Dr. Jignesh Leslie      Refill sent to get pt to appt. minutes